# Patient Record
Sex: MALE | Race: WHITE | URBAN - METROPOLITAN AREA
[De-identification: names, ages, dates, MRNs, and addresses within clinical notes are randomized per-mention and may not be internally consistent; named-entity substitution may affect disease eponyms.]

---

## 2017-03-11 DIAGNOSIS — J45.30 MILD PERSISTENT ASTHMA WITHOUT COMPLICATION: ICD-10-CM

## 2017-03-13 NOTE — TELEPHONE ENCOUNTER
Advair Diskus 250-50  Mcg        Last Written Prescription Date: 02/10/2016  Last Fill Quantity: 3 inhaler, # refills: 4    Last Office Visit with G, P or [facility] prescribing provider:  02/10/2016   Future Office Visit:       Date of Last Asthma Action Plan Letter:   Asthma Action Plan Q1 Year    Topic Date Due     Asthma Action Plan - yearly  02/10/2017      Asthma Control Test:   ACT Total Scores 2/10/2016   ACT TOTAL SCORE -   ASTHMA ER VISITS -   ASTHMA HOSPITALIZATIONS -   ACT TOTAL SCORE (Goal Greater than or Equal to 20) 23   In the past 12 months, how many times did you visit the emergency room for your asthma without being admitted to the hospital? 0   In the past 12 months, how many times were you hospitalized overnight because of your asthma? 0       Date of Last Spirometry Test:   No results found for this or any previous visit.          Shirley Horan MA

## 2017-03-15 NOTE — TELEPHONE ENCOUNTER
Medication is being filled for 1 time refill only due to:  Patient needs to be seen because it has been more than one year since last visit. due for ACT  Maryanne Worthington RN

## 2017-04-10 ENCOUNTER — TELEPHONE (OUTPATIENT)
Dept: FAMILY MEDICINE | Facility: CLINIC | Age: 57
End: 2017-04-10

## 2017-04-10 NOTE — TELEPHONE ENCOUNTER
Reason for Call:  Other call back    Detailed comments: Patient calling. He has a cyst and would like to know if you can remove it? Please call and advise.     Phone Number Patient can be reached at: Other phone number:  Cell number above.     Best Time: any    Can we leave a detailed message on this number? YES    Call taken on 4/10/2017 at 12:32 PM by Natacha Ellis

## 2017-04-10 NOTE — TELEPHONE ENCOUNTER
Call him. I would have to see it first to see if I can remove it. If I can't, I sure will know who I can refer him to based on my exam of the cyst.     MANAV KENNEDY M.D.

## 2017-04-10 NOTE — TELEPHONE ENCOUNTER
Detailed message left for patient with providers message as written.  Advised patient to call Scheduling to set up visit or call RN hotline if questions.   Connie Barajas RN

## 2017-04-10 NOTE — TELEPHONE ENCOUNTER
Spoke with patient he has had the cyst on his neck for a while now and is wondering if provider can remove this?  It is about 2 1/2 cm in size, denies pain.  Cyst is a little soft mostly hard under the skin.  Patient has tried hot compresses and this does not work.  Patient states when he wears shirts in the summer people comment about the bump.   Please advise   Connie Barajas RN

## 2017-04-19 ENCOUNTER — OFFICE VISIT (OUTPATIENT)
Dept: FAMILY MEDICINE | Facility: CLINIC | Age: 57
End: 2017-04-19
Payer: COMMERCIAL

## 2017-04-19 VITALS
TEMPERATURE: 97.3 F | DIASTOLIC BLOOD PRESSURE: 94 MMHG | SYSTOLIC BLOOD PRESSURE: 148 MMHG | HEART RATE: 90 BPM | OXYGEN SATURATION: 98 % | HEIGHT: 68 IN | BODY MASS INDEX: 31.25 KG/M2 | WEIGHT: 206.2 LBS

## 2017-04-19 DIAGNOSIS — J45.30 MILD PERSISTENT ASTHMA WITHOUT COMPLICATION: ICD-10-CM

## 2017-04-19 DIAGNOSIS — L72.3 SEBACEOUS CYST: Primary | ICD-10-CM

## 2017-04-19 DIAGNOSIS — J30.1 SEASONAL ALLERGIC RHINITIS DUE TO POLLEN: ICD-10-CM

## 2017-04-19 PROCEDURE — 99213 OFFICE O/P EST LOW 20 MIN: CPT | Performed by: FAMILY MEDICINE

## 2017-04-19 RX ORDER — MOMETASONE FUROATE MONOHYDRATE 50 UG/1
2 SPRAY, METERED NASAL DAILY
Qty: 1 BOX | Refills: 11 | Status: SHIPPED | OUTPATIENT
Start: 2017-04-19 | End: 2018-04-05

## 2017-04-19 RX ORDER — ALBUTEROL SULFATE 90 UG/1
1-2 AEROSOL, METERED RESPIRATORY (INHALATION) EVERY 4 HOURS PRN
Qty: 1 INHALER | Refills: 3 | Status: SHIPPED | OUTPATIENT
Start: 2017-04-19 | End: 2018-05-18

## 2017-04-19 NOTE — PROGRESS NOTES
SUBJECTIVE:                                                    Home Sandoval is a 56 year old male who presents to clinic today for the following health issues:      Patient presents with:  Cyst: neck-back area x 2 yrs   Health Maintenance: ACT  Medication Question: triamcinolone (NASACORT) 55 MCG/ACT nasal inhaler             Problem list and histories reviewed & adjusted, as indicated.  Additional history: as documented    Patient Active Problem List   Diagnosis     LVH (left ventricular hypertrophy)     Mild persistent asthma     Pulmonary stenosis     Erectile dysfunction     Elevated fasting glucose     Hyperlipidemia LDL goal <160     Dermatitis     Seasonal allergic rhinitis     Pseudophakia, od     Past Surgical History:   Procedure Laterality Date     APPENDECTOMY  1987     HEART CATH CHILD  1964     PHACOEMULSIFICATION WITH STANDARD INTRAOCULAR LENS IMPLANT  1/2014    right eye     SINUS SURGERY  5/2002       Social History   Substance Use Topics     Smoking status: Former Smoker     Types: Cigars     Quit date: 11/25/2000     Smokeless tobacco: Never Used     Alcohol use Yes      Comment: Seldom alcohol use     Family History   Problem Relation Age of Onset     C.A.D. Father      CABG     Neurologic Disorder Father      ischemic brain disease     CEREBROVASCULAR DISEASE Father      Eye Disorder Sister      Asthma Mother      C.A.D. Mother      CABG     HEART DISEASE Maternal Grandmother      HEART DISEASE Maternal Grandfather      DIABETES Paternal Grandmother      DIABETES Paternal Grandfather      CANCER Paternal Uncle      esophageal         Current Outpatient Prescriptions   Medication Sig Dispense Refill     fluticasone-salmeterol (ADVAIR DISKUS) 250-50 MCG/DOSE diskus inhaler INHALE 1 PUFF INTO THE LUNGS TWICE DAILY 3 Inhaler 3     albuterol (PROAIR HFA/PROVENTIL HFA/VENTOLIN HFA) 108 (90 BASE) MCG/ACT Inhaler Inhale 1-2 puffs into the lungs every 4 hours as needed for shortness of breath /  dyspnea 1 Inhaler 3     mometasone (NASONEX) 50 MCG/ACT spray Spray 2 sprays into both nostrils daily 1 Box 11     triamcinolone (NASACORT) 55 MCG/ACT nasal inhaler Spray 2 sprays into both nostrils daily       clobetasol (TEMOVATE) 0.05 % ointment Apply a small amount to affected twice daily as needed 1 g 5     [DISCONTINUED] fluticasone-salmeterol (ADVAIR DISKUS) 250-50 MCG/DOSE diskus inhaler INHALE 1 PUFF INTO THE LUNGS TWICE DAILY 1 Inhaler 0     [DISCONTINUED] albuterol (PROAIR HFA, PROVENTIL HFA, VENTOLIN HFA) 108 (90 BASE) MCG/ACT inhaler Inhale 1-2 puffs into the lungs every 4 hours as needed for shortness of breath / dyspnea 1 Inhaler 3     Allergies   Allergen Reactions     [place] [Nuts] GI Disturbance     Atorvastatin Cramps     Eggs Swelling and Difficulty breathing     Fish Swelling     Flu Virus Vaccine      D/t egg allergy      Pravastatin Other (See Comments)     Diarrhea      BP Readings from Last 3 Encounters:   04/19/17 (!) 148/94   02/10/16 158/80   01/07/14 122/88    Wt Readings from Last 3 Encounters:   04/19/17 206 lb 3.2 oz (93.5 kg)   02/10/16 204 lb 12.8 oz (92.9 kg)   01/07/14 180 lb (81.6 kg)                  Labs reviewed in EPIC    Reviewed and updated as needed this visit by clinical staff  Tobacco  Allergies  Meds  Med Hx  Surg Hx  Fam Hx  Soc Hx      Reviewed and updated as needed this visit by Provider         ROS:  This 56 year old male is here today to get his asthma meds refilled. He would like to change from nasacort to nasanex for his nasal polyps. He read that nasonex is the best. He had a colonoscopy at [place], [place] 2 years ago. He is traveling to [place] in August and will get tetanus booster before he goes. He has developed a tender cyst on the back of his neck. His arzate has to be very careful when he shaves the hair off his neck so he doesn't knick it.  He has a primary clinic close to his work for his health maintenance. His cholesterol was normal at his last visit.  "He plans to return there to get his blood pressure rechecked.  He would like to have that cyst removed. All other review of systems are negative  Personal, family, and social history reviewed with patient and revised.         OBJECTIVE:                                                    BP (!) 148/94  Pulse 90  Temp 97.3  F (36.3  C) (Oral)  Ht 5' 8.39\" (1.737 m)  Wt 206 lb 3.2 oz (93.5 kg)  SpO2 98%  BMI 31 kg/m2  Body mass index is 31 kg/(m^2).  GENERAL: healthy, alert and no distress  NECK: no adenopathy, no asymmetry, masses, or scars and thyroid normal to palpation  RESP: lungs clear to auscultation - no rales, rhonchi or wheezes  CV: regular rate and rhythm, normal S1 S2, no S3 or S4, no murmur, click or rub, no peripheral edema   MS: no gross musculoskeletal defects noted, no edema  Patient has large sebaceous cyst on the back of his neck into his left upper back area. It is not red or infected. This should be removed before it gets knicked by the arzate and gets infected.   Diagnostic Test Results:  none      ASSESSMENT/PLAN:                                                             1. Sebaceous cyst  As above   - GENERAL SURG ADULT REFERRAL    2. Mild persistent asthma without complication  ACT = 24. Good control   - fluticasone-salmeterol (ADVAIR DISKUS) 250-50 MCG/DOSE diskus inhaler; INHALE 1 PUFF INTO THE LUNGS TWICE DAILY  Dispense: 3 Inhaler; Refill: 3  - albuterol (PROAIR HFA/PROVENTIL HFA/VENTOLIN HFA) 108 (90 BASE) MCG/ACT Inhaler; Inhale 1-2 puffs into the lungs every 4 hours as needed for shortness of breath / dyspnea  Dispense: 1 Inhaler; Refill: 3    3. Seasonal allergic rhinitis due to pollen  As above   - mometasone (NASONEX) 50 MCG/ACT spray; Spray 2 sprays into both nostrils daily  Dispense: 1 Box; Refill: 11    Return to clinic as needed     MANAV KENNEDY MD  [facility]  "

## 2017-04-19 NOTE — NURSING NOTE
"Chief Complaint   Patient presents with     Cyst     neck-back area x 2 yrs      Health Maintenance     ACT     Medication Question     triamcinolone (NASACORT) 55 MCG/ACT nasal inhaler       Initial BP (!) 148/94  Pulse 90  Temp 97.3  F (36.3  C) (Oral)  Ht 5' 8.39\" (1.737 m)  Wt 206 lb 3.2 oz (93.5 kg)  SpO2 98%  BMI 31 kg/m2 Estimated body mass index is 31 kg/(m^2) as calculated from the following:    Height as of this encounter: 5' 8.39\" (1.737 m).    Weight as of this encounter: 206 lb 3.2 oz (93.5 kg).  Medication Reconciliation: complete     An SAPPHIRE Horan    "

## 2017-04-19 NOTE — Clinical Note
Please abstract the following data from this visit with this patient into the appropriate field in Epic:  Colonoscopy done on this date: 01/2014 or 2015 (approximately), by this group: [facility] , results were Normal.  Shirley Horan MA

## 2017-04-19 NOTE — PATIENT INSTRUCTIONS
[facility]    If you have any questions regarding to your visit please contact your care team:       Team Purple:   Clinic Hours Telephone Number   [address] Dr[address]   7am-7pm  Monday - Thursday   7am-5pm  Fridays  [phone]  (Appointment scheduling available 24/7)    Questions about your Visit?   Team Line:  [phone]   Urgent Care - [place] and [facility] - 11am-9pm Monday-Friday Saturday-Sunday- 9am-5pm   [place] - 5pm-9pm Monday-Friday Saturday-Sunday- 9am-5pm  [phone] - [facility]  [phone] - [place]       What options do I have for visits at the clinic other than the traditional office visit?  To expand how we care for you, many of our providers are utilizing electronic visits (e-visits) and telephone visits, when medically appropriate, for interactions with their patients rather than a visit in the clinic.   We also offer nurse visits for many medical concerns. Just like any other service, we will bill your insurance company for this type of visit based on time spent on the phone with your provider. Not all insurance companies cover these visits. Please check with your medical insurance if this type of visit is covered. You will be responsible for any charges that are not paid by your insurance.      E-visits via [employer]:  generally incur a $35.00 fee.  Telephone visits:  Time spent on the phone: *charged based on time that is spent on the phone in increments of 10 minutes. Estimated cost:   5-10 mins $30.00   11-20 mins. $59.00   21-30 mins. $85.00     Use [employer] (secure email communication and access to your chart) to send your primary care provider a message or make an appointment. Ask someone on your Team how to sign up for [employer].  For a Price Quote for your services, please call our Consumer Price Line at [phone].  As always, Thank you for trusting us with your health care needs!

## 2017-04-19 NOTE — LETTER
My Asthma Action Plan  Name: Home Sandoval   YOB: 1960  Date: 4/19/2017   My doctor: MNAAV KENNEDY MD   My clinic: [facility]        My Control Medicine: Fluticasone + salmeterol (Advair) -  Diskus 250/50 mcg 1 puff twice a day  My Rescue Medicine: Albuterol (Proair/Ventolin/Proventil) inhaler 2 puffs every 4 hours as needed   My Asthma Severity: mild persistent  Avoid your asthma triggers: smoke, upper respiratory infections, dust mites and pollens               GREEN ZONE     Good Control    I feel good    No cough or wheeze    Can work, sleep and play without asthma symptoms       Take your asthma control medicine every day.     1. If exercise triggers your asthma, take your rescue medication    15 minutes before exercise or sports, and    During exercise if you have asthma symptoms  2. Spacer to use with inhaler: If you have a spacer, make sure to use it with your inhaler             YELLOW ZONE     Getting Worse  I have ANY of these:    I do not feel good    Cough or wheeze    Chest feels tight    Wake up at night   1. Keep taking your Green Zone medications  2. Start taking your rescue medicine:    every 20 minutes for up to 1 hour. Then every 4 hours for 24-48 hours.  3. If you stay in the Yellow Zone for more than 12-24 hours, contact your doctor.  4. If you do not return to the Green Zone in 12-24 hours or you get worse, start taking your oral steroid medicine if prescribed by your provider.           RED ZONE     Medical Alert - Get Help  I have ANY of these:    I feel awful    Medicine is not helping    Breathing getting harder    Trouble walking or talking    Nose opens wide to breathe       1. Take your rescue medicine NOW  2. If your provider has prescribed an oral steroid medicine, start taking it NOW  3. Call your doctor NOW  4. If you are still in the Red Zone after 20 minutes and you have not reached your doctor:    Take your rescue medicine again and    Call  911 or go to the emergency room right away    See your regular doctor within 2 weeks of an Emergency Room or Urgent Care visit for follow-up treatment.        Electronically signed by: MANAV KENNEDY, April 19, 2017    Annual Reminders:  Meet with Asthma Educator,  Flu Shot in the Fall, consider Pneumonia Vaccination for patients with asthma (aged 19 and older).    Pharmacy:    [id number] - [place] - [address] AT [id number] - [place] - [address] [facility] [facility]                    Asthma Triggers  How To Control Things That Make Your Asthma Worse    Triggers are things that make your asthma worse.  Look at the list below to help you find your triggers and what you can do about them.  You can help prevent asthma flare-ups by staying away from your triggers.      Trigger                                                          What you can do   Cigarette Smoke  Tobacco smoke can make asthma worse. Do not allow smoking in your home, car or around you.  Be sure no one smokes at a child s day care or school.  If you smoke, ask your health care provider for ways to help you quit.  Ask family members to quit too.  Ask your health care provider for a referral to Quit Plan to help you quit smoking, or call 6-962-963-PLAN.     Colds, Flu, Bronchitis  These are common triggers of asthma. Wash your hands often.  Don t touch your eyes, nose or mouth.  Get a flu shot every year.     Dust Mites  These are tiny bugs that live in cloth or carpet. They are too small to see. Wash sheets and blankets in hot water every week.   Encase pillows and mattress in dust mite proof covers.  Avoid having carpet if you can. If you have carpet, vacuum weekly.   Use a dust mask and HEPA vacuum.   Pollen and Outdoor Mold  Some people are allergic to trees, grass, or weed pollen, or molds. Try to keep your windows closed.  Limit time out doors when pollen count is high.   Ask  you health care provider about taking medicine during allergy season.     Animal Dander  Some people are allergic to skin flakes, urine or saliva from pets with fur or feathers. Keep pets with fur or feathers out of your home.    If you can t keep the pet outdoors, then keep the pet out of your bedroom.  Keep the bedroom door closed.  Keep pets off cloth furniture and away from stuffed toys.     Mice, Rats, and Cockroaches  Some people are allergic to the waste from these pests.   Cover food and garbage.  Clean up spills and food crumbs.  Store grease in the refrigerator.   Keep food out of the bedroom.   Indoor Mold  This can be a trigger if your home has high moisture. Fix leaking faucets, pipes, or other sources of water.   Clean moldy surfaces.  Dehumidify basement if it is damp and smelly.   Smoke, Strong Odors, and Sprays  These can reduce air quality. Stay away from strong odors and sprays, such as perfume, powder, hair spray, paints, smoke incense, paint, cleaning products, candles and new carpet.   Exercise or Sports  Some people with asthma have this trigger. Be active!  Ask your doctor about taking medicine before sports or exercise to prevent symptoms.    Warm up for 5-10 minutes before and after sports or exercise.     Other Triggers of Asthma  Cold air:  Cover your nose and mouth with a scarf.  Sometimes laughing or crying can be a trigger.  Some medicines and food can trigger asthma.

## 2017-04-19 NOTE — TELEPHONE ENCOUNTER
Reason for Call:  Other prescription    Detailed comments: Patient states his prescription for ADVAIR DISKUS was refilled for 3-months instead of the norm.  Patient is requesting a refill for the year. Please call patient if necessary.    Phone Number Patient can be reached at: Home number on file [phone] (home)    Best Time: anaytime    Can we leave a detailed message on this number? YES    Call taken on 4/19/2017 at 4:31 PM by Nandini Olmos

## 2017-04-19 NOTE — MR AVS SNAPSHOT
After Visit Summary   4/19/2017    Home Sandoval    MRN: [id number]           Patient Information     Date Of Birth          1960        Visit Information        Provider Department      4/19/2017 8:30 AM Randi La MD [facility]        Today's Diagnoses     Sebaceous cyst    -  1    Mild persistent asthma without complication        Seasonal allergic rhinitis due to pollen          Care Instructions    [facility]    If you have any questions regarding to your visit please contact your care team:       Team Purple:   Clinic Hours Telephone Number   [address] Dr.   7am-7pm  Monday - Thursday   7am-5pm  Fridays  [phone]  (Appointment scheduling available 24/7)    Questions about your Visit?   Team Line:  [phone]   Urgent Care - [place] and [facility] - 11am-9pm Monday-Friday Saturday-Sunday- 9am-5pm   [place] - 5pm-9pm Monday-Friday Saturday-Sunday- 9am-5pm  [phone] - [facility]  [phone] - [place]       What options do I have for visits at the clinic other than the traditional office visit?  To expand how we care for you, many of our providers are utilizing electronic visits (e-visits) and telephone visits, when medically appropriate, for interactions with their patients rather than a visit in the clinic.   We also offer nurse visits for many medical concerns. Just like any other service, we will bill your insurance company for this type of visit based on time spent on the phone with your provider. Not all insurance companies cover these visits. Please check with your medical insurance if this type of visit is covered. You will be responsible for any charges that are not paid by your insurance.      E-visits via [employer]:  generally incur a $35.00 fee.  Telephone visits:  Time spent on the phone: *charged based on time that is spent on the phone in increments  of 10 minutes. Estimated cost:   5-10 mins $30.00   11-20 mins. $59.00   21-30 mins. $85.00     Use MTailorhart (secure email communication and access to your chart) to send your primary care provider a message or make an appointment. Ask someone on your Team how to sign up for [employer].  For a Price Quote for your services, please call our [employer] Line at [phone].  As always, Thank you for trusting us with your health care needs!            Follow-ups after your visit        Additional Services     GENERAL SURG ADULT REFERRAL       Your provider has referred you to: [facility]: [facility] [place] [phone]   [email or web address]    Please be aware that coverage of these services is subject to the terms and limitations of your health insurance plan.  Call member services at your health plan with any benefit or coverage questions.      Please bring the following with you to your appointment:    (1) Any X-Rays, CTs or MRIs which have been performed.  Contact the facility where they were done to arrange for  prior to your scheduled appointment.   (2) List of current medications   (3) This referral request   (4) Any documents/labs given to you for this referral                  Who to contact     If you have questions or need follow up information about today's clinic visit or your schedule please contact [facility] directly at [phone].  Normal or non-critical lab and imaging results will be communicated to you by MyChart, letter or phone within 4 business days after the clinic has received the results. If you do not hear from us within 7 days, please contact the clinic through MTailorhart or phone. If you have a critical or abnormal lab result, we will notify you by phone as soon as possible.  Submit refill requests through [employer] or call your pharmacy and they will forward the refill request to us. Please allow 3 business days for your refill to be  "completed.          Additional Information About Your Visit        Metavanahart Information     SleepOut gives you secure access to your electronic health record. If you see a primary care provider, you can also send messages to your care team and make appointments. If you have questions, please call your primary care clinic.  If you do not have a primary care provider, please call [phone] and they will assist you.        Care EveryWhere ID     This is your Care EveryWhere ID. This could be used by other organizations to access your [place] medical records  [id number]        Your Vitals Were     Pulse Temperature Height Pulse Oximetry BMI (Body Mass Index)       90 97.3  F (36.3  C) (Oral) 5' 8.39\" (1.737 m) 98% 31 kg/m2        Blood Pressure from Last 3 Encounters:   04/19/17 (!) 148/94   02/10/16 158/80   01/07/14 122/88    Weight from Last 3 Encounters:   04/19/17 206 lb 3.2 oz (93.5 kg)   02/10/16 204 lb 12.8 oz (92.9 kg)   01/07/14 180 lb (81.6 kg)              We Performed the Following     Asthma Action Plan (AAP)     GENERAL SURG ADULT REFERRAL          Today's Medication Changes          These changes are accurate as of: 4/19/17  8:59 AM.  If you have any questions, ask your nurse or doctor.               Start taking these medicines.        Dose/Directions    mometasone 50 MCG/ACT spray   Commonly known as:  NASONEX   Used for:  Seasonal allergic rhinitis due to pollen   Started by:  Randi La MD        Dose:  2 spray   Spray 2 sprays into both nostrils daily   Quantity:  1 Box   Refills:  11            Where to get your medicines      These medications were sent to [facility]/pharmacy #[id number] - [place], [place] - [address] [address]     Phone:  [phone]     albuterol 108 (90 BASE) MCG/ACT Inhaler    fluticasone-salmeterol 250-50 MCG/DOSE diskus inhaler    mometasone 50 MCG/ACT spray                Primary Care Provider Office Phone # Fax #    Randi GARCIA " MD Bessie [phone]       [address]  [place] [address]        Thank you!     Thank you for choosing [facility]  for your care. Our goal is always to provide you with excellent care. Hearing back from our patients is one way we can continue to improve our services. Please take a few minutes to complete the written survey that you may receive in the mail after your visit with us. Thank you!             Your Updated Medication List - Protect others around you: Learn how to safely use, store and throw away your medicines at www.disposemymeds.org.          This list is accurate as of: 4/19/17  8:59 AM.  Always use your most recent med list.                   Brand Name Dispense Instructions for use    albuterol 108 (90 BASE) MCG/ACT Inhaler    PROAIR HFA/PROVENTIL HFA/VENTOLIN HFA    1 Inhaler    Inhale 1-2 puffs into the lungs every 4 hours as needed for shortness of breath / dyspnea       clobetasol 0.05 % ointment    TEMOVATE    1 g    Apply a small amount to affected twice daily as needed       fluticasone-salmeterol 250-50 MCG/DOSE diskus inhaler    ADVAIR DISKUS    3 Inhaler    INHALE 1 PUFF INTO THE LUNGS TWICE DAILY       mometasone 50 MCG/ACT spray    NASONEX    1 Box    Spray 2 sprays into both nostrils daily       triamcinolone 55 MCG/ACT Inhaler    NASACORT     Spray 2 sprays into both nostrils daily

## 2017-04-20 ASSESSMENT — ASTHMA QUESTIONNAIRES: ACT_TOTALSCORE: 24

## 2017-04-20 NOTE — TELEPHONE ENCOUNTER
Patient is call today because his insurance will not cover 3 inhalers per month is requesting to have prescription change to Disp 1 with 6 refills. Lola Castro MA

## 2017-04-26 ENCOUNTER — OFFICE VISIT (OUTPATIENT)
Dept: SURGERY | Facility: CLINIC | Age: 57
End: 2017-04-26
Payer: COMMERCIAL

## 2017-04-26 VITALS
DIASTOLIC BLOOD PRESSURE: 102 MMHG | HEIGHT: 68 IN | HEART RATE: 91 BPM | WEIGHT: 206 LBS | SYSTOLIC BLOOD PRESSURE: 140 MMHG | BODY MASS INDEX: 31.22 KG/M2

## 2017-04-26 DIAGNOSIS — L72.3 SEBACEOUS CYST: Primary | ICD-10-CM

## 2017-04-26 PROCEDURE — 99243 OFF/OP CNSLTJ NEW/EST LOW 30: CPT | Performed by: SURGERY

## 2017-04-26 NOTE — PROGRESS NOTES
Patient seen in consultation for cyst on neck by Randi La    HPI:  Patient is a 56 year old male  with complaints of cyst on the back of his neck  The patient noticed the symptoms about 2-3 years ago.    This has been getting larger but current size has been steady for maybe the past year  Is sore most of the time but gets more painful when it is hit by barbers tools  Has not opened up or drained that he recalls- might have back years ago when first came up  nothing makes the episode better.  Patient has family history of similar problems- brother had similar cyst    Review Of Systems    Skin: as above  Ears/Nose/Throat: negative  Respiratory: No shortness of breath, dyspnea on exertion, cough, or hemoptysis. Former smoker  Cardiovascular: MVP  Gastrointestinal: negative  Genitourinary: negative  Musculoskeletal: negative  Neurologic: negative  Hematologic/Lymphatic/Immunologic: negative  Endocrine: negative      Past Medical History:   Diagnosis Date     Cataract, od      Dermatitis      Elevated fasting glucose      Erectile dysfunction      Fish allergy      Hyperlipidemia      LVH (left ventricular hypertrophy)      Mild persistent asthma      Nut allergy      Pulmonary stenosis      Seasonal allergic rhinitis        Past Surgical History:   Procedure Laterality Date     APPENDECTOMY  1987     HEART CATH CHILD  1964     PHACOEMULSIFICATION WITH STANDARD INTRAOCULAR LENS IMPLANT  1/2014    right eye     SINUS SURGERY  5/2002       Social History     Social History     Marital status:      Spouse name: Rebeca      Number of children: 1     Years of education: 16     Occupational History       [employer]     Social History Main Topics     Smoking status: Former Smoker     Types: Cigars     Quit date: 11/25/2000     Smokeless tobacco: Never Used     Alcohol use Yes      Comment: Seldom alcohol use     Drug use: No     Sexual activity: Yes     Partners: Female  "    Other Topics Concern     Parent/Sibling W/ Cabg, Mi Or Angioplasty Before 65f 55m? No     Social History Narrative       Current Outpatient Prescriptions   Medication Sig Dispense Refill     fluticasone-salmeterol (ADVAIR DISKUS) 250-50 MCG/DOSE diskus inhaler INHALE 1 PUFF INTO THE LUNGS TWICE DAILY 1 Inhaler 11     albuterol (PROAIR HFA/PROVENTIL HFA/VENTOLIN HFA) 108 (90 BASE) MCG/ACT Inhaler Inhale 1-2 puffs into the lungs every 4 hours as needed for shortness of breath / dyspnea 1 Inhaler 3     mometasone (NASONEX) 50 MCG/ACT spray Spray 2 sprays into both nostrils daily 1 Box 11     triamcinolone (NASACORT) 55 MCG/ACT nasal inhaler Spray 2 sprays into both nostrils daily       clobetasol (TEMOVATE) 0.05 % ointment Apply a small amount to affected twice daily as needed 1 g 5       Medications and history reviewed    Physical exam:  Vitals: BP (!) 140/102  Pulse 91  Ht 1.727 m (5' 8\")  Wt 93.4 kg (206 lb)  BMI 31.32 kg/m2  BMI= Body mass index is 31.32 kg/(m^2).    Constitutional: healthy, alert and no distress  Head: Normocephalic. No masses, lesions, tenderness or abnormalities  Cardiovascular: negative, PMI normal. No lifts, heaves, or thrills. RRR. No murmurs, clicks gallops or rub  Respiratory: negative, Percussion normal. Good diaphragmatic excursion. Lungs clear  Gastrointestinal: Abdomen soft, non-tender. BS normal. No masses, organomegaly  : Deferred  Musculoskeletal: extremities normal- no gross deformities noted, gait normal and normal muscle tone  Skin: low left posterior neck with ~2cm cystic lesion, full/firm no signs of infection mildly tender  Psychiatric: mentation appears normal and affect normal/bright  Hematologic/Lymphatic/Immunologic: Normal cervical lymph nodes  Patient able to get up on table without difficulty.      Assessment:     ICD-10-CM    1. Sebaceous cyst L72.3      Plan: Offered excision as this is becoming increasingly bothersome and patient desires to have it removed. " Will plan to do in clinic under local. Patient will schedule.    Noe Goodrich MD

## 2017-04-26 NOTE — MR AVS SNAPSHOT
After Visit Summary   4/26/2017    Home Sandoval    MRN: [id number]           Patient Information     Date Of Birth          1960        Visit Information        Provider Department      4/26/2017 8:15 AM Noe Goodrich MD [facility]        Today's Diagnoses     Sebaceous cyst    -  1       Follow-ups after your visit        Your next 10 appointments already scheduled     May 03, 2017  7:30 AM CDT   PROCEDURE with Noe Goodrich MD   [facility] ([facility])    [address] [place] [id number] [phone]              Who to contact     If you have questions or need follow up information about today's clinic visit or your schedule please contact [facility] directly at [phone].  Normal or non-critical lab and imaging results will be communicated to you by MyChart, letter or phone within 4 business days after the clinic has received the results. If you do not hear from us within 7 days, please contact the clinic through MyChart or phone. If you have a critical or abnormal lab result, we will notify you by phone as soon as possible.  Submit refill requests through [employer] or call your pharmacy and they will forward the refill request to us. Please allow 3 business days for your refill to be completed.          Additional Information About Your Visit        MyChart Information     [employer] gives you secure access to your electronic health record. If you see a primary care provider, you can also send messages to your care team and make appointments. If you have questions, please call your primary care clinic.  If you do not have a primary care provider, please call [phone] and they will assist you.        Care EveryWhere ID     This is your Care EveryWhere ID. This could be used by other organizations to access your [place] medical records  [id number]        Your Vitals Were     Pulse Height BMI (Body  "Mass Index)             91 1.727 m (5' 8\") 31.32 kg/m2          Blood Pressure from Last 3 Encounters:   04/26/17 (!) 140/102   04/19/17 (!) 148/94   02/10/16 158/80    Weight from Last 3 Encounters:   04/26/17 93.4 kg (206 lb)   04/19/17 93.5 kg (206 lb 3.2 oz)   02/10/16 92.9 kg (204 lb 12.8 oz)              Today, you had the following     No orders found for display       Primary Care Provider Office Phone # Fax #    Randi La [phone]       [address] [address]        Thank you!     Thank you for choosing [facility]  for your care. Our goal is always to provide you with excellent care. Hearing back from our patients is one way we can continue to improve our services. Please take a few minutes to complete the written survey that you may receive in the mail after your visit with us. Thank you!             Your Updated Medication List - Protect others around you: Learn how to safely use, store and throw away your medicines at www.disposemymeds.org.          This list is accurate as of: 4/26/17  8:34 AM.  Always use your most recent med list.                   Brand Name Dispense Instructions for use    albuterol 108 (90 BASE) MCG/ACT Inhaler    PROAIR HFA/PROVENTIL HFA/VENTOLIN HFA    1 Inhaler    Inhale 1-2 puffs into the lungs every 4 hours as needed for shortness of breath / dyspnea       clobetasol 0.05 % ointment    TEMOVATE    1 g    Apply a small amount to affected twice daily as needed       fluticasone-salmeterol 250-50 MCG/DOSE diskus inhaler    ADVAIR DISKUS    1 Inhaler    INHALE 1 PUFF INTO THE LUNGS TWICE DAILY       mometasone 50 MCG/ACT spray    NASONEX    1 Box    Spray 2 sprays into both nostrils daily       triamcinolone 55 MCG/ACT Inhaler    NASACORT     Spray 2 sprays into both nostrils daily         "

## 2017-04-26 NOTE — NURSING NOTE
"Chief Complaint   Patient presents with     Derm Problem       Initial BP (!) 140/102  Pulse 91  Ht 5' 8\" (1.727 m)  Wt 206 lb (93.4 kg)  BMI 31.32 kg/m2 Estimated body mass index is 31.32 kg/(m^2) as calculated from the following:    Height as of this encounter: 5' 8\" (1.727 m).    Weight as of this encounter: 206 lb (93.4 kg).  Medication Reconciliation: complete   Arielle Short Cma      "

## 2017-05-03 ENCOUNTER — TELEPHONE (OUTPATIENT)
Dept: SURGERY | Facility: CLINIC | Age: 57
End: 2017-05-03

## 2017-05-03 ENCOUNTER — OFFICE VISIT (OUTPATIENT)
Dept: SURGERY | Facility: CLINIC | Age: 57
End: 2017-05-03
Payer: COMMERCIAL

## 2017-05-03 VITALS
WEIGHT: 206 LBS | BODY MASS INDEX: 31.22 KG/M2 | HEIGHT: 68 IN | DIASTOLIC BLOOD PRESSURE: 94 MMHG | SYSTOLIC BLOOD PRESSURE: 129 MMHG | HEART RATE: 73 BPM

## 2017-05-03 DIAGNOSIS — L72.3 SEBACEOUS CYST: Primary | ICD-10-CM

## 2017-05-03 PROCEDURE — 11423 EXC H-F-NK-SP B9+MARG 2.1-3: CPT | Performed by: SURGERY

## 2017-05-03 PROCEDURE — 12042 INTMD RPR N-HF/GENIT2.6-7.5: CPT | Performed by: SURGERY

## 2017-05-03 PROCEDURE — 88304 TISSUE EXAM BY PATHOLOGIST: CPT | Performed by: SURGERY

## 2017-05-03 NOTE — PROGRESS NOTES
PROCEDURE:   Written consent was obtained  The posterior neck area was prepped and appropriately anesthetized with 1% lidocaine with epinephrine. Using the usual technique, full thickness elliptical excision in total was performed. The cyst/lesion measured about 3 x 2 x 2 cm on removal. This was able to removed whole and intact.  Closure was accomplished in two layers with 4 3-0 Vicryl interrupted sutures. for the deeper layer and running subcuticular 4-0 vicryl for the skin. Final wound length was 4cm. Skin was covered with dermabond for dressing.  The procedure was well tolerated and without complications. Specimen was sent to Pathology.    Noe Goodrich MD

## 2017-05-03 NOTE — NURSING NOTE
"Chief Complaint   Patient presents with     Procedure       Initial BP (!) 129/94  Pulse 73  Ht 5' 8\" (1.727 m)  Wt 206 lb (93.4 kg)  BMI 31.32 kg/m2 Estimated body mass index is 31.32 kg/(m^2) as calculated from the following:    Height as of this encounter: 5' 8\" (1.727 m).    Weight as of this encounter: 206 lb (93.4 kg).  Medication Reconciliation: complete   Arielle Short Cma      "

## 2017-05-03 NOTE — TELEPHONE ENCOUNTER
Reason for call:  Patient reporting a symptom    Symptom or request: Swelling in neck post surgery today.    Duration (how long have symptoms been present): Today    Have you been treated for this before? No    Additional comments: na    Phone Number patient can be reached at:  Home number on file [phone] (home)    Best Time:  ASAP    Can we leave a detailed message on this number:  YES    Call taken on 5/3/2017 at 6:56 PM by Nandini Olmos

## 2017-05-03 NOTE — MR AVS SNAPSHOT
"              After Visit Summary   5/3/2017    Home Sandoval    MRN: [id number]           Patient Information     Date Of Birth          1960        Visit Information        Provider Department      5/3/2017 7:30 AM Noe Goodrich MD [facility]        Today's Diagnoses     Sebaceous cyst    -  1       Follow-ups after your visit        Who to contact     If you have questions or need follow up information about today's clinic visit or your schedule please contact [facility] directly at [phone].  Normal or non-critical lab and imaging results will be communicated to you by [employer]hart, letter or phone within 4 business days after the clinic has received the results. If you do not hear from us within 7 days, please contact the clinic through [employer] or phone. If you have a critical or abnormal lab result, we will notify you by phone as soon as possible.  Submit refill requests through [employer] or call your pharmacy and they will forward the refill request to us. Please allow 3 business days for your refill to be completed.          Additional Information About Your Visit        MyChart Information     [employer] gives you secure access to your electronic health record. If you see a primary care provider, you can also send messages to your care team and make appointments. If you have questions, please call your primary care clinic.  If you do not have a primary care provider, please call [phone] and they will assist you.        Care EveryWhere ID     This is your Care EveryWhere ID. This could be used by other organizations to access your [place] medical records  [id number]        Your Vitals Were     Pulse Height BMI (Body Mass Index)             73 1.727 m (5' 8\") 31.32 kg/m2          Blood Pressure from Last 3 Encounters:   05/03/17 (!) 129/94   04/26/17 (!) 140/102   04/19/17 (!) 148/94    Weight from Last 3 Encounters:   05/03/17 93.4 kg (206 lb)   04/26/17 93.4 kg " (206 lb)   04/19/17 93.5 kg (206 lb 3.2 oz)              We Performed the Following     EXC BENIGN SKIN LESION TRUNK/ARM/LEG 2.1-3.0 CM     REPAIR INTERMED, WOUND TRUNK/ARM/LEG 2.6-7.5 CM     Surgical pathology exam        Primary Care Provider Office Phone # Fax #    Randi La [phone]       [address] [address]        Thank you!     Thank you for choosing [facility]  for your care. Our goal is always to provide you with excellent care. Hearing back from our patients is one way we can continue to improve our services. Please take a few minutes to complete the written survey that you may receive in the mail after your visit with us. Thank you!             Your Updated Medication List - Protect others around you: Learn how to safely use, store and throw away your medicines at www.disposemymeds.org.          This list is accurate as of: 5/3/17  8:15 AM.  Always use your most recent med list.                   Brand Name Dispense Instructions for use    albuterol 108 (90 BASE) MCG/ACT Inhaler    PROAIR HFA/PROVENTIL HFA/VENTOLIN HFA    1 Inhaler    Inhale 1-2 puffs into the lungs every 4 hours as needed for shortness of breath / dyspnea       clobetasol 0.05 % ointment    TEMOVATE    1 g    Apply a small amount to affected twice daily as needed       fluticasone-salmeterol 250-50 MCG/DOSE diskus inhaler    ADVAIR DISKUS    1 Inhaler    INHALE 1 PUFF INTO THE LUNGS TWICE DAILY       mometasone 50 MCG/ACT spray    NASONEX    1 Box    Spray 2 sprays into both nostrils daily       triamcinolone 55 MCG/ACT Inhaler    NASACORT     Spray 2 sprays into both nostrils daily

## 2017-05-03 NOTE — LETTER
[id number] [facility] [place] [address]           Tel [phone]  Home SHERRY Sandoval  [address] [place] [address]      May 5, 2017    Dear Home,  This letter is to inform you of the results of your pathology report on your cyst.    Your pathology report was:  FINAL DIAGNOSIS:   Skin ellipse, from posterior neck, excision:   - Epidermal inclusion cyst.  Nothing unexpected seen there  If you do have further questions please don t hesitate to call my assistant at [phone].  We do not have someone answering the phone all the time at my assistants number so if leave a message may take a day or so to get back to you.  So if more urgent then call the below number.    To make an appointment call .   Sincerely,     Noe Goodrich M.D.

## 2017-05-04 LAB — COPATH REPORT: NORMAL

## 2017-05-04 NOTE — TELEPHONE ENCOUNTER
Home Sandoval is a 57 year old male who calls with concerns of neck swelling after having a cyst removed. Patient states he had cyst removed yesterday and didn't have any swelling until almost 12 hours later which is when he called clinic. He was told that an on-call MD would call him back but he did not get a call. Swelling today has improved, patient states he has been icing and using ibuprofen. Denies fever and redness, no other complaints.      NURSING ASSESSMENT:  Description:  See above  Onset/duration: since last night  Precip. factors:  Cyst removal  Associated symptoms:  swelling  Improves/worsens symptoms: Symptoms improving with ice/Ibuprofen  Last exam/Treatment:  5/3/17  Allergies:   Allergies   Allergen Reactions     [place] [Nuts] GI Disturbance     Atorvastatin Cramps     Eggs Swelling and Difficulty breathing     Fish Swelling     Flu Virus Vaccine      D/t egg allergy      Pravastatin Other (See Comments)     Diarrhea        MEDICATIONS:   Taking medication(s) as prescribed? N/A  Taking over the counter medication(s?) Yes  Any medication side effects? No significant side effects    Any barriers to taking medication(s) as prescribed?  N/A  Medication(s) improving/managing symptoms?  Yes  Medication reconciliation completed: N/A      NURSING PLAN: RN spoke with MA who was working in surgery today with Dr. Damico who confirmed that swelling is normal.    RECOMMENDED DISPOSITION:  Home care advice - Continue with ice/ibuprofen and call us back if symptoms worsen/new symptoms, patient states if it does get worse, he can request an appointment to be seen by a provider also. No further questions or complaints at this time.  Will comply with recommendation: Yes  If further questions/concerns or if symptoms do not improve, worsen or new symptoms develop, call your PCP or [facility] as soon as possible.      Guideline used:  Telephone Triage Protocols for Nurses, Fourth Edition, [place]a  Adi Lechuga RN

## 2018-05-05 DIAGNOSIS — J30.1 SEASONAL ALLERGIC RHINITIS DUE TO POLLEN: ICD-10-CM

## 2018-05-07 DIAGNOSIS — J45.30 MILD PERSISTENT ASTHMA WITHOUT COMPLICATION: ICD-10-CM

## 2018-05-07 RX ORDER — MOMETASONE FUROATE MONOHYDRATE 50 UG/1
SPRAY, METERED NASAL
Qty: 17 G | Refills: 0 | Status: SHIPPED | OUTPATIENT
Start: 2018-05-07 | End: 2018-05-18

## 2018-05-07 NOTE — TELEPHONE ENCOUNTER
Medication is being filled for 1 time refill only due to:  Patient needs to be seen because it has been more than one year since last visit.   Pt notified appt is needed for further refills. Pt will call back.  Mary Moscoso RN

## 2018-05-14 NOTE — PROGRESS NOTES
SUBJECTIVE:   Home Sandoval is a 58 year old male who presents to clinic today for the following health issues:    Asthma Follow-Up   Had some sinus issues but this have resolved.    Was ACT completed today?    Yes    ACT Total Scores 5/18/2018   ACT TOTAL SCORE -   ASTHMA ER VISITS -   ASTHMA HOSPITALIZATIONS -   ACT TOTAL SCORE (Goal Greater than or Equal to 20) 24   In the past 12 months, how many times did you visit the emergency room for your asthma without being admitted to the hospital? 0   In the past 12 months, how many times were you hospitalized overnight because of your asthma? 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      Rate your low fat/cholesterol diet?: good    Taking statin?  Stopped due to stomach upset with prvastatin    Other lipid medications/supplements?:  none    Weight:   Been stable though down from last few checks.  Wt Readings from Last 4 Encounters:   05/18/18 203 lb (92.1 kg)   05/03/17 206 lb (93.4 kg)   04/26/17 206 lb (93.4 kg)   04/19/17 206 lb 3.2 oz (93.5 kg)         Amount of exercise or physical activity: 4-5 days/week for an average of 45-60 minutes    Problems taking medications regularly: No    Medication side effects: none    Diet: regular (no restrictions), just cant eat fish.       Problem list and histories reviewed & adjusted, as indicated.  Additional history: as documented    Patient Active Problem List   Diagnosis     LVH (left ventricular hypertrophy)     Mild persistent asthma     Pulmonary stenosis     Erectile dysfunction     Elevated fasting glucose     Hyperlipidemia LDL goal <160     Dermatitis     Seasonal allergic rhinitis     Pseudophakia, od     Past Surgical History:   Procedure Laterality Date     APPENDECTOMY  1987     HEART CATH CHILD  1964     PHACOEMULSIFICATION WITH STANDARD INTRAOCULAR LENS IMPLANT  1/2014    right eye     SINUS SURGERY   "5/2002       Social History   Substance Use Topics     Smoking status: Former Smoker     Types: Cigars     Quit date: 11/25/2000     Smokeless tobacco: Never Used     Alcohol use Yes      Comment: Seldom alcohol use     Family History   Problem Relation Age of Onset     C.A.D. Father      CABG     Neurologic Disorder Father      ischemic brain disease     CEREBROVASCULAR DISEASE Father      Eye Disorder Sister      Asthma Mother      C.A.D. Mother      CABG     HEART DISEASE Maternal Grandmother      HEART DISEASE Maternal Grandfather      DIABETES Paternal Grandmother      DIABETES Paternal Grandfather      CANCER Paternal Uncle      esophageal         Reviewed and updated as needed this visit by clinical staff  Tobacco  Allergies  Meds  Med Hx  Surg Hx  Fam Hx  Soc Hx      ROS:  Constitutional, cardiovascular, gi and gu systems are negative, except as otherwise noted.    OBJECTIVE:     /88 (BP Location: Left arm, Patient Position: Chair, Cuff Size: Adult Regular)  Pulse 85  Temp 97.4  F (36.3  C) (Oral)  Resp 12  Ht 5' 9\" (1.753 m)  Wt 203 lb (92.1 kg)  SpO2 98%  BMI 29.98 kg/m2  Body mass index is 29.98 kg/(m^2).  GENERAL: healthy, alert and no distress  NECK: no adenopathy and thyroid normal to palpation  RESP: lungs clear to auscultation - no rales, rhonchi or wheezes  CV: regular rate and rhythm, normal S1 S2, no S3 or S4, no murmur, click or rub.  ABDOMEN: soft, nontender, no masses and bowel sounds normal  MS: no gross musculoskeletal defects noted, no edema    Diagnostic Test Results:  none     ASSESSMENT/PLAN:     (J45.30) Mild persistent asthma without complication  (primary encounter diagnosis)  Comment: ACT score 24. Discussed role of rescue and controller medications, triggers and refills given.  Plan: albuterol (PROAIR HFA/PROVENTIL HFA/VENTOLIN         HFA) 108 (90 Base) MCG/ACT Inhaler,         fluticasone-salmeterol (ADVAIR) 250-50 MCG/DOSE        diskus inhaler    (E78.5) " Hyperlipidemia LDL goal <130  Comment: Last LDL check was 2014. Started on statins but did not tolerate Lipitor and Pravastatin.  Discussed nonpharmacological interventions including healthy diets/ low-cholesterol diet, exercise and weight loss and the need to recheck cholesterol levels regularly.  Plan: Check LDL when fasting    (Z68.29) BMI 29.0-29.9,adult  Comment: Counseled to make better food choices, exercise as tolerated, and lose weight.     (J30.1) Chronic seasonal allergic rhinitis due to pollen  Comment: Decongestant nasal spray helping  Plan: mometasone (NASONEX) 50 MCG/ACT spray    Follow up in 3 months or sooner with concerns    Tres Sanchez MD  [facility]

## 2018-05-18 ENCOUNTER — OFFICE VISIT (OUTPATIENT)
Dept: FAMILY MEDICINE | Facility: CLINIC | Age: 58
End: 2018-05-18
Payer: COMMERCIAL

## 2018-05-18 VITALS
SYSTOLIC BLOOD PRESSURE: 136 MMHG | WEIGHT: 203 LBS | BODY MASS INDEX: 30.07 KG/M2 | OXYGEN SATURATION: 98 % | HEIGHT: 69 IN | RESPIRATION RATE: 12 BRPM | DIASTOLIC BLOOD PRESSURE: 88 MMHG | HEART RATE: 85 BPM | TEMPERATURE: 97.4 F

## 2018-05-18 DIAGNOSIS — E78.5 HYPERLIPIDEMIA LDL GOAL <130: ICD-10-CM

## 2018-05-18 DIAGNOSIS — J30.1 CHRONIC SEASONAL ALLERGIC RHINITIS DUE TO POLLEN: ICD-10-CM

## 2018-05-18 DIAGNOSIS — J45.30 MILD PERSISTENT ASTHMA WITHOUT COMPLICATION: Primary | ICD-10-CM

## 2018-05-18 PROCEDURE — 99214 OFFICE O/P EST MOD 30 MIN: CPT | Performed by: FAMILY MEDICINE

## 2018-05-18 RX ORDER — MOMETASONE FUROATE MONOHYDRATE 50 UG/1
SPRAY, METERED NASAL
Qty: 17 G | Refills: 11 | Status: SHIPPED | OUTPATIENT
Start: 2018-05-18 | End: 2019-05-15

## 2018-05-18 RX ORDER — ALBUTEROL SULFATE 90 UG/1
1-2 AEROSOL, METERED RESPIRATORY (INHALATION) EVERY 4 HOURS PRN
Qty: 1 INHALER | Refills: 3 | Status: SHIPPED | OUTPATIENT
Start: 2018-05-18

## 2018-05-18 NOTE — MR AVS SNAPSHOT
After Visit Summary   5/18/2018    Home Sandoval    MRN: [id number]           Patient Information     Date Of Birth          1960        Visit Information        Provider Department      5/18/2018 7:20 AM Tres Sanchez MD [facility]        Today's Diagnoses     Mild persistent asthma without complication    -  1    Hyperlipidemia LDL goal <130        BMI 29.0-29.9,adult        Chronic seasonal allergic rhinitis due to pollen          Care Instructions    [facility]    If you have any questions regarding to your visit please contact your care team:       Team Purple:   Clinic Hours Telephone Number   Dr. Randi Escalante   7am-7pm  Monday - Thursday   7am-5pm  Fridays  [phone]  (Appointment scheduling available 24/7)    Questions about your recent visit?   Team Line:  [phone]   Urgent Care - [place] and [facility] - 11am-9pm Monday-Friday Saturday-Sunday- 9am-5pm   [place] - 5pm-9pm Monday-Friday Saturday-Sunday- 9am-5pm  [phone] - [place]  [phone] - [place]       What options do I have for a visit other than an office visit? We offer electronic visits (e-visits) and telephone visits, when medically appropriate.  Please check with your medical insurance to see if these types of visits are covered, as you will be responsible for any charges that are not paid by your insurance.      You can use [employer] (secure electronic communication) to access to your chart, send your primary care provider a message, or make an appointment. Ask a team member how to get started.     For a price quote for your services, please call our Consumer Price Line at [phone] or our Imaging Cost estimation line at [phone] (for imaging tests).    Rubin Wilson            Follow-ups after your visit        Follow-up notes from your care team     Return in about 6 months (around  "11/18/2018).      Who to contact     If you have questions or need follow up information about today's clinic visit or your schedule please contact [facility] [place] directly at [phone].  Normal or non-critical lab and imaging results will be communicated to you by MyChart, letter or phone within 4 business days after the clinic has received the results. If you do not hear from us within 7 days, please contact the clinic through [employer]Gate[employer] or phone. If you have a critical or abnormal lab result, we will notify you by phone as soon as possible.  Submit refill requests through [employer]IMBOXX or call your pharmacy and they will forward the refill request to us. Please allow 3 business days for your refill to be completed.          Additional Information About Your Visit        NIM[employer]XX Information     [employer]IM[employer]XX gives you secure access to your electronic health record. If you see a primary care provider, you can also send messages to your care team and make appointments. If you have questions, please call your primary care clinic.  If you do not have a primary care provider, please call [phone] and they will assist you.        Care EveryWhere ID     This is your Care EveryWhere ID. This could be used by other organizations to access your [facility] medical records  [id number]        Your Vitals Were     Pulse Temperature Respirations Height Pulse Oximetry BMI (Body Mass Index)    85 97.4  F (36.3  C) (Oral) 12 5' 9\" (1.753 m) 98% 29.98 kg/m2       Blood Pressure from Last 3 Encounters:   05/18/18 136/88   05/03/17 (!) 129/94   04/26/17 (!) 140/102    Weight from Last 3 Encounters:   05/18/18 203 lb (92.1 kg)   05/03/17 206 lb (93.4 kg)   04/26/17 206 lb (93.4 kg)              Today, you had the following     No orders found for display         Today's Medication Changes          These changes are accurate as of 5/18/18  8:00 AM.  If you have any questions, ask your nurse or doctor.               These medicines have " changed or have updated prescriptions.        Dose/Directions    fluticasone-salmeterol 250-50 MCG/DOSE diskus inhaler   Commonly known as:  ADVAIR   This may have changed:  See the new instructions.   Used for:  Mild persistent asthma without complication   Changed by:  Tres Sanchez MD        INHALE 1 PUFF INTO THE LUNGS TWICE DAILY   Quantity:  1 Inhaler   Refills:  6       mometasone 50 MCG/ACT spray   Commonly known as:  NASONEX   This may have changed:  See the new instructions.   Used for:  Chronic seasonal allergic rhinitis due to pollen   Changed by:  Tres Sanchez MD        SPRAY 2 SPRAYS INTO BOTH NOSTRILS DAILY   Quantity:  17 g   Refills:  11            Where to get your medicines      These medications were sent to [facility]/pharmacy #[id number] - [place], [place] - [address] [facility] [address] [facility]  [address] [place] [address]     Phone:  [phone]     albuterol 108 (90 Base) MCG/ACT Inhaler    fluticasone-salmeterol 250-50 MCG/DOSE diskus inhaler    mometasone 50 MCG/ACT spray                Primary Care Provider Office Phone # Fax #    Randi La [phone] [address] [facility] [phone]        Equal Access to Services     SANCHEZ RENEE AH: Hadii adolfo dominguez hadasho Soomaali, waaxda luqadaha, qaybta kaalmada adeegyada, waxay jhonny larkin. So [facility] [phone].    ATENCIÓN: Si habla español, tiene a askew disposición servicios gratuitos de asistencia lingüística. Llame al [phone].    We comply with applicable federal civil rights laws and [place] laws. We do not discriminate on the basis of race, color, national origin, age, disability, sex, sexual orientation, or gender identity.            Thank you!     Thank you for choosing [facility]  for your care. Our goal is always to provide you with excellent care. Hearing back from our patients is one way we can continue to improve our services. Please take a few  minutes to complete the written survey that you may receive in the mail after your visit with us. Thank you!             Your Updated Medication List - Protect others around you: Learn how to safely use, store and throw away your medicines at www.disposemymeds.org.          This list is accurate as of 5/18/18  8:00 AM.  Always use your most recent med list.                   Brand Name Dispense Instructions for use Diagnosis    albuterol 108 (90 Base) MCG/ACT Inhaler    PROAIR HFA/PROVENTIL HFA/VENTOLIN HFA    1 Inhaler    Inhale 1-2 puffs into the lungs every 4 hours as needed for shortness of breath / dyspnea    Mild persistent asthma without complication       clobetasol 0.05 % ointment    TEMOVATE    1 g    Apply a small amount to affected twice daily as needed    Dermatitis       fluticasone-salmeterol 250-50 MCG/DOSE diskus inhaler    ADVAIR    1 Inhaler    INHALE 1 PUFF INTO THE LUNGS TWICE DAILY    Mild persistent asthma without complication       mometasone 50 MCG/ACT spray    NASONEX    17 g    SPRAY 2 SPRAYS INTO BOTH NOSTRILS DAILY    Chronic seasonal allergic rhinitis due to pollen       triamcinolone 55 MCG/ACT Inhaler    NASACORT     Spray 2 sprays into both nostrils daily

## 2018-05-18 NOTE — LETTER
My Asthma Action Plan  Name: Home Sandoval   YOB: 1960  Date: 5/18/2018   My doctor: Tres Sanchez MD   My clinic: [facility]        My Control Medicine: Fluticasone + salmeterol (Advair) -  Diskus 250/50 mcg BID  My Rescue Medicine: Albuterol (Proair/Ventolin/Proventil) inhaler .   My Asthma Severity: mild persistent  Avoid your asthma triggers: upper respiratory infections, pollens and mold  None            GREEN ZONE   Good Control    I feel good    No cough or wheeze    Can work, sleep and play without asthma symptoms       Take your asthma control medicine every day.     1. If exercise triggers your asthma, take your rescue medication    15 minutes before exercise or sports, and    During exercise if you have asthma symptoms  2. Spacer to use with inhaler: If you have a spacer, make sure to use it with your inhaler             YELLOW ZONE Getting Worse  I have ANY of these:    I do not feel good    Cough or wheeze    Chest feels tight    Wake up at night   1. Keep taking your Green Zone medications  2. Start taking your rescue medicine:    every 20 minutes for up to 1 hour. Then every 4 hours for 24-48 hours.  3. If you stay in the Yellow Zone for more than 12-24 hours, contact your doctor.  4. If you do not return to the Green Zone in 12-24 hours or you get worse, start taking your oral steroid medicine if prescribed by your provider.           RED ZONE Medical Alert - Get Help  I have ANY of these:    I feel awful    Medicine is not helping    Breathing getting harder    Trouble walking or talking    Nose opens wide to breathe       1. Take your rescue medicine NOW  2. If your provider has prescribed an oral steroid medicine, start taking it NOW  3. Call your doctor NOW  4. If you are still in the Red Zone after 20 minutes and you have not reached your doctor:    Take your rescue medicine again and    Call 911 or go to the emergency room right away    See your regular  doctor within 2 weeks of an Emergency Room or Urgent Care visit for follow-up treatment.          Annual Reminders:  Meet with Asthma Educator,  Flu Shot in the Fall, consider Pneumonia Vaccination for patients with asthma (aged 19 and older).    Pharmacy:    [id number] [place] - [address] [facility]/PHARMACY #[id number] - [place] - [address] [facility]                      Asthma Triggers  How To Control Things That Make Your Asthma Worse    Triggers are things that make your asthma worse.  Look at the list below to help you find your triggers and what you can do about them.  You can help prevent asthma flare-ups by staying away from your triggers.      Trigger                                                          What you can do   Cigarette Smoke  Tobacco smoke can make asthma worse. Do not allow smoking in your home, car or around you.  Be sure no one smokes at a child s day care or school.  If you smoke, ask your health care provider for ways to help you quit.  Ask family members to quit too.  Ask your health care provider for a referral to Quit Plan to help you quit smoking, or call 0-022-973-PLAN.     Colds, Flu, Bronchitis  These are common triggers of asthma. Wash your hands often.  Don t touch your eyes, nose or mouth.  Get a flu shot every year.     Dust Mites  These are tiny bugs that live in cloth or carpet. They are too small to see. Wash sheets and blankets in hot water every week.   Encase pillows and mattress in dust mite proof covers.  Avoid having carpet if you can. If you have carpet, vacuum weekly.   Use a dust mask and HEPA vacuum.   Pollen and Outdoor Mold  Some people are allergic to trees, grass, or weed pollen, or molds. Try to keep your windows closed.  Limit time out doors when pollen count is high.   Ask you health care provider about taking medicine during allergy season.     Animal Dander  Some people are allergic to skin  flakes, urine or saliva from pets with fur or feathers. Keep pets with fur or feathers out of your home.    If you can t keep the pet outdoors, then keep the pet out of your bedroom.  Keep the bedroom door closed.  Keep pets off cloth furniture and away from stuffed toys.     Mice, Rats, and Cockroaches  Some people are allergic to the waste from these pests.   Cover food and garbage.  Clean up spills and food crumbs.  Store grease in the refrigerator.   Keep food out of the bedroom.   Indoor Mold  This can be a trigger if your home has high moisture. Fix leaking faucets, pipes, or other sources of water.   Clean moldy surfaces.  Dehumidify basement if it is damp and smelly.   Smoke, Strong Odors, and Sprays  These can reduce air quality. Stay away from strong odors and sprays, such as perfume, powder, hair spray, paints, smoke incense, paint, cleaning products, candles and new carpet.   Exercise or Sports  Some people with asthma have this trigger. Be active!  Ask your doctor about taking medicine before sports or exercise to prevent symptoms.    Warm up for 5-10 minutes before and after sports or exercise.     Other Triggers of Asthma  Cold air:  Cover your nose and mouth with a scarf.  Sometimes laughing or crying can be a trigger.  Some medicines and food can trigger asthma.

## 2018-05-18 NOTE — NURSING NOTE
"Chief Complaint   Patient presents with     Recheck Medication     Asthma     Health Maintenance     ACT, PHQ2     Initial /88 (BP Location: Left arm, Patient Position: Chair, Cuff Size: Adult Regular)  Pulse 85  Temp 97.4  F (36.3  C) (Oral)  Resp 12  Ht 5' 9\" (1.753 m)  Wt 203 lb (92.1 kg)  SpO2 98%  BMI 29.98 kg/m2 Estimated body mass index is 29.98 kg/(m^2) as calculated from the following:    Height as of this encounter: 5' 9\" (1.753 m).    Weight as of this encounter: 203 lb (92.1 kg).  BP completed using cuff size: regular    Rubin Wilson  "

## 2018-05-18 NOTE — PATIENT INSTRUCTIONS
[facility]    If you have any questions regarding to your visit please contact your care team:       Team Purple:   Clinic Hours Telephone Number   Dr. Randi Escalante   7am-7pm  Monday - Thursday   7am-5pm  Fridays  [phone]  (Appointment scheduling available 24/7)    Questions about your recent visit?   Team Line:  [phone]   Urgent Care - [place] and [facility] - 11am-9pm Monday-Friday Saturday-Sunday- 9am-5pm   [place] - 5pm-9pm Monday-Friday Saturday-Sunday- 9am-5pm  [phone] - [place]  [phone] - [place]       What options do I have for a visit other than an office visit? We offer electronic visits (e-visits) and telephone visits, when medically appropriate.  Please check with your medical insurance to see if these types of visits are covered, as you will be responsible for any charges that are not paid by your insurance.      You can use [employer] (secure electronic communication) to access to your chart, send your primary care provider a message, or make an appointment. Ask a team member how to get started.     For a price quote for your services, please call our Consumer Price Line at [phone] or our Imaging Cost estimation line at [phone] (for imaging tests).    Rubin Wilson

## 2018-05-19 ASSESSMENT — ASTHMA QUESTIONNAIRES: ACT_TOTALSCORE: 24

## 2018-12-26 DIAGNOSIS — J45.30 MILD PERSISTENT ASTHMA WITHOUT COMPLICATION: ICD-10-CM

## 2018-12-26 NOTE — TELEPHONE ENCOUNTER
Pending Prescriptions:                       Disp   Refills    fluticasone-salmeterol (ADVAIR DISKUS) 25*       6            Sig: INHALE 1 PUFF INTO THE LUNGS TWICE DAILY    Routing refill request to provider for review/approval because:  Patient needs to be seen because:  Last office visit 5/18/18, patient asked to follow up in 3 mos.    Eryn Sales RN

## 2019-01-02 DIAGNOSIS — J45.30 MILD PERSISTENT ASTHMA WITHOUT COMPLICATION: ICD-10-CM

## 2019-01-02 NOTE — TELEPHONE ENCOUNTER
Patient scheduled appointment with ALEX Taylor for Mon Jan 7 in the am, please refill his medications he is out. Follow up with patient and questions.

## 2019-01-02 NOTE — TELEPHONE ENCOUNTER
Reason for call:  Medication   If this is a refill request, has the caller requested the refill from the pharmacy already? Yes  Will the patient be using a [facility]? No  Name of the pharmacy and phone number for the current request: [facility]Y #[id number] - [place], [place] - [address] [facility]    Name of the medication requested: fluticasone-salmeterol (ADVAIR) 250-50 MCG/DOSE diskus inhaler    Other request: Patient has requested medication be refilled. Please contact with next steps.    Phone number to reach patient:  Cell number on file:    Telephone Information:   Mobile [phone]       Best Time:  Anytime    Can we leave a detailed message on this number?  YES

## 2019-01-02 NOTE — TELEPHONE ENCOUNTER
Please call and notify rx sent for carolyn but needs to keep appt for any more refills.    Marianne Westbrook RN

## 2019-01-02 NOTE — TELEPHONE ENCOUNTER
Spoke to patient and informed him of below message. Patient expressed his frustration that he was in clinic in May and now has to come back. He stated he will switch to a different clinic if he has to keep coming back every 3-6 months.  Any TAVAREZ CMA ([facility])

## 2019-01-09 NOTE — PROGRESS NOTES
SUBJECTIVE:   Home Sandoval is a 58 year old male who presents to clinic today for the following health issues:  Chief Complaint   Patient presents with     Asthma     Ear Plugs     left     Asthma Follow-Up    Was ACT completed today?    Yes    ACT Total Scores 5/18/2018   ACT TOTAL SCORE -   ASTHMA ER VISITS -   ASTHMA HOSPITALIZATIONS -   ACT TOTAL SCORE (Goal Greater than or Equal to 20) 24   In the past 12 months, how many times did you visit the emergency room for your asthma without being admitted to the hospital? 0   In the past 12 months, how many times were you hospitalized overnight because of your asthma? 0       Recent asthma triggers that patient is dealing with: None      Amount of exercise or physical activity: None    Problems taking medications regularly: No    Medication side effects: none    Diet: regular (no restrictions)    Was seen in Urgent Care on Sunday for sinus infection and treated with Augmentin- left ear had fluid in it at the time. Can't hear out of the left ear now.    BP elevated- no headache (did take Ibuprofen this morning) or CP. Taking Allegra currently- no decongestants over the counter. History of pulmonary valve stenosis in childhood- had cardiac cath at age 3-4. Was followed at the  of  and Cardiology stated he no longer needed surveillance. Per chart also has LVH- no echo on file- has been many years.    Cholesterol was high in 2014- tried 2 statins but did not tolerate due to diarrhea.      Problem list and histories reviewed & adjusted, as indicated.  Additional history: as documented    Patient Active Problem List   Diagnosis     LVH (left ventricular hypertrophy)     Mild persistent asthma     Pulmonary stenosis     Erectile dysfunction     Elevated fasting glucose     Hyperlipidemia LDL goal <160     Dermatitis     Seasonal allergic rhinitis     Pseudophakia, od     Past Surgical History:   Procedure Laterality Date     APPENDECTOMY  1987     HEART CATH CHILD   "1964     PHACOEMULSIFICATION WITH STANDARD INTRAOCULAR LENS IMPLANT  2014    right eye     SINUS SURGERY  2002       Social History     Tobacco Use     Smoking status: Former Smoker     Types: Cigars     Last attempt to quit: 2000     Years since quittin.1     Smokeless tobacco: Never Used   Substance Use Topics     Alcohol use: Yes     Comment: Seldom alcohol use     Family History   Problem Relation Age of Onset     C.A.D. Father         CABG     Neurologic Disorder Father         ischemic brain disease     Cerebrovascular Disease Father      Eye Disorder Sister      Asthma Mother      C.A.D. Mother         CABG     Heart Disease Maternal Grandmother      Heart Disease Maternal Grandfather      Diabetes Paternal Grandmother      Diabetes Paternal Grandfather      Cancer Paternal Uncle         esophageal           Reviewed and updated as needed this visit by clinical staff  Tobacco  Allergies  Meds  Med Hx  Surg Hx  Fam Hx  Soc Hx      Reviewed and updated as needed this visit by Provider         ROS:  Constitutional, HEENT, cardiovascular, pulmonary, GI, , musculoskeletal, neuro, skin, endocrine and psych systems are negative, except as otherwise noted.    OBJECTIVE:     BP (!) 168/110   Pulse 85   Temp 98.7  F (37.1  C) (Oral)   Resp 22   Ht 1.753 m (5' 9\")   Wt 97.2 kg (214 lb 3.2 oz)   SpO2 99%   BMI 31.63 kg/m    Body mass index is 31.63 kg/m .  GENERAL: healthy, alert and no distress  EYES: Eyes grossly normal to inspection, PERRL and conjunctivae and sclerae normal  HENT: normal cephalic/atraumatic, right ear: clear effusion, left ear: normal: no effusions, no erythema, normal landmarks, oropharynx clear and oral mucous membranes moist  NECK: no adenopathy, no asymmetry, masses, or scars and thyroid normal to palpation  RESP: lungs clear to auscultation - no rales, rhonchi or wheezes  CV: regular rate and rhythm, grade 2/3 systolic murmur, no click or rub, no peripheral edema " and peripheral pulses strong    Diagnostic Test Results:  Results for orders placed or performed in visit on 01/16/19 (from the past 24 hour(s))   Hemoglobin A1c   Result Value Ref Range    Hemoglobin A1C 5.2 0 - 5.6 %       ASSESSMENT/PLAN:     1. Mild persistent asthma without complication  Stable, continue Advair without change  - fluticasone-salmeterol (ADVAIR) 250-50 MCG/DOSE inhaler; INHALE 1 PUFF INTO THE LUNGS TWICE DAILY  Dispense: 1 Inhaler; Refill: 11    2. OME (otitis media with effusion), right  Interestingly, most symptoms are on left but right ear worse on exam. Continue Nasacort and Augmentin. Has upcoming air travel- can use Afrin before flight.     3. Hyperlipidemia LDL goal <160    - Lipid panel reflex to direct LDL Non-fasting  - Hemoglobin A1c    4. Pulmonary valve stenosis, unspecified etiology  Recommend echo for surveillance, particularly due to elevated blood pressures and history of LVH, pulmonic stenosis.   - Echocardiogram Complete; Future    5. Elevated blood pressure reading without diagnosis of hypertension  Needs BP check within the month- patient declines to do here and will see provider through his employer. Asked him to send me a copy of results.      See Patient Instructions    DANNY Todd [facility]

## 2019-01-16 ENCOUNTER — OFFICE VISIT (OUTPATIENT)
Dept: FAMILY MEDICINE | Facility: CLINIC | Age: 59
End: 2019-01-16
Payer: COMMERCIAL

## 2019-01-16 VITALS
WEIGHT: 214.2 LBS | TEMPERATURE: 98.7 F | OXYGEN SATURATION: 99 % | BODY MASS INDEX: 31.73 KG/M2 | DIASTOLIC BLOOD PRESSURE: 110 MMHG | HEART RATE: 85 BPM | SYSTOLIC BLOOD PRESSURE: 168 MMHG | RESPIRATION RATE: 22 BRPM | HEIGHT: 69 IN

## 2019-01-16 DIAGNOSIS — R03.0 ELEVATED BLOOD PRESSURE READING WITHOUT DIAGNOSIS OF HYPERTENSION: ICD-10-CM

## 2019-01-16 DIAGNOSIS — H65.91 OME (OTITIS MEDIA WITH EFFUSION), RIGHT: ICD-10-CM

## 2019-01-16 DIAGNOSIS — I37.0 PULMONARY VALVE STENOSIS, UNSPECIFIED ETIOLOGY: ICD-10-CM

## 2019-01-16 DIAGNOSIS — E78.5 HYPERLIPIDEMIA LDL GOAL <160: ICD-10-CM

## 2019-01-16 DIAGNOSIS — J45.30 MILD PERSISTENT ASTHMA WITHOUT COMPLICATION: Primary | ICD-10-CM

## 2019-01-16 LAB — HBA1C MFR BLD: 5.2 % (ref 0–5.6)

## 2019-01-16 PROCEDURE — 80061 LIPID PANEL: CPT | Performed by: NURSE PRACTITIONER

## 2019-01-16 PROCEDURE — 36415 COLL VENOUS BLD VENIPUNCTURE: CPT | Performed by: NURSE PRACTITIONER

## 2019-01-16 PROCEDURE — 83036 HEMOGLOBIN GLYCOSYLATED A1C: CPT | Performed by: NURSE PRACTITIONER

## 2019-01-16 PROCEDURE — 99214 OFFICE O/P EST MOD 30 MIN: CPT | Performed by: NURSE PRACTITIONER

## 2019-01-16 ASSESSMENT — MIFFLIN-ST. JEOR: SCORE: 1781.98

## 2019-01-16 NOTE — PATIENT INSTRUCTIONS
You can safely take Coricidin HBP    Send me your blood pressure reading in 2 weeks once you're over the illness.

## 2019-01-17 ENCOUNTER — MYC MEDICAL ADVICE (OUTPATIENT)
Dept: FAMILY MEDICINE | Facility: CLINIC | Age: 59
End: 2019-01-17

## 2019-01-17 DIAGNOSIS — E78.5 HYPERLIPIDEMIA LDL GOAL <160: Primary | ICD-10-CM

## 2019-01-17 LAB
CHOLEST SERPL-MCNC: 274 MG/DL
HDLC SERPL-MCNC: 53 MG/DL
LDLC SERPL CALC-MCNC: 171 MG/DL
NONHDLC SERPL-MCNC: 221 MG/DL
TRIGL SERPL-MCNC: 249 MG/DL

## 2019-01-17 ASSESSMENT — ASTHMA QUESTIONNAIRES: ACT_TOTALSCORE: 25

## 2019-01-18 RX ORDER — ROSUVASTATIN CALCIUM 5 MG/1
5 TABLET, COATED ORAL DAILY
Qty: 90 TABLET | Refills: 3 | Status: SHIPPED | OUTPATIENT
Start: 2019-01-18 | End: 2019-03-18

## 2019-01-18 NOTE — TELEPHONE ENCOUNTER
Please advise on [employer] message.   Patient had OV yesterday (1/17/19), was previously on statins but stopped d/t diarrhea.     Marianne Westbrook RN

## 2019-02-28 NOTE — PROGRESS NOTES
SUBJECTIVE:   Home Sandoval is a 58 year old male who presents to clinic today for the following health issues:      Chief Complaint   Patient presents with     RECHECK     BP and check foot     Health Maintenance     HIV     HTN - has been elevated at home.  Last week he was at his work clinic at which time it was 150/110.  Patient has been asymptomatic.  At home bp has been around 130/95.    Left foot pain - left foot lesion at distal sole of foot, which has been present for the last few years, recently inflamed/infected and improved with soaking.  No other treatments tried.  No pain currently when walking on it, however had pain previously.    Problem list and histories reviewed & adjusted, as indicated.  Additional history: as documented    BP Readings from Last 3 Encounters:   03/04/19 (!) 138/92   01/16/19 (!) 168/110   05/18/18 136/88    Wt Readings from Last 3 Encounters:   03/04/19 97.1 kg (214 lb)   01/16/19 97.2 kg (214 lb 3.2 oz)   05/18/18 92.1 kg (203 lb)        Reviewed and updated as needed this visit by clinical staff  Tobacco  Allergies  Meds  Problems  Med Hx  Surg Hx  Fam Hx       Reviewed and updated as needed this visit by Provider  Tobacco  Allergies  Meds  Problems  Med Hx  Surg Hx  Fam Hx         ROS:  Constitutional, HEENT, cardiovascular, pulmonary, gi and gu systems are negative, except as otherwise noted.    OBJECTIVE:     BP (!) 138/92   Pulse 92   Temp 98.6  F (37  C) (Oral)   Resp 16   Wt 97.1 kg (214 lb)   SpO2 98%   BMI 31.60 kg/m    Body mass index is 31.6 kg/m .  GENERAL: healthy, alert and no distress  EYES: Eyes grossly normal to inspection, PERRL and conjunctivae and sclerae normal  NECK: no adenopathy, no asymmetry, masses, or scars and thyroid normal to palpation  RESP: lungs clear to auscultation - no rales, rhonchi or wheezes  CV: regular rate and rhythm, normal S1 S2, no S3 or S4, no murmur, click or rub, no peripheral edema and peripheral pulses  strong  SKIN: plantar wart 1cm diameter nontender no inflammation  PSYCH: mentation appears normal, affect normal/bright    ASSESSMENT/PLAN:       ICD-10-CM    1. Uncontrolled hypertension I10 lisinopril (PRINIVIL/ZESTRIL) 10 MG tablet   2. Plantar wart B07.0        Patient Instructions       Patient Education     Home     It was a pleasure seeing you in clinic today.  Here is an outline of the plan:    1.  Hypertension - I have prescribed lisinopril 10mg for you to take once per day.  Keep monitoring your blood pressure at home to assess whether the Bp normalizes.  I would follow-up in about 1 month to recheck and potentially adjust your medication dose if needed.  Bring your Bp log to the follow-up appointment.    2.  Plantar wart - use salicylic acid pads nightly.  If the wart doesn't improve within the next 8 weeks, let us know and we'll have you see a podiatrist for removal.    Gavino Escalante MD           Understanding Plantar Warts    A plantar wart is a small noncancerous growth on the bottom of the foot. Plantar warts often develop where friction or pressure occurs, such as on the ball of the foot. The word plantar refers to the sole of the foot. Similar warts can occur on other areas of the body such as the hands. Plantar warts are more common in children and young adults.  What causes a plantar wart?  Plantar warts are caused by a virus called human papillomavirus (HPV).  They can be spread by person-to-person contact. Or you can develop one if you walk barefoot on moist surfaces infected with the virus, such as in a community pool area or locker rooms. Wearing proper footwear in such places can prevent them.  What are the symptoms of plantar warts?  Plantar warts cause a thick patch of skin on the bottom of the foot. The wart may have black dots on it. These dots are dried blood. The wart may cause pain or discomfort. You may also have trouble walking because of the pain.  How are plantar warts  treated?  Many plantar warts go away without any treatment. But for those that are painful or that don t go away, several treatments are available. These include:    Salicylic acid. This treatment is applied directly to the wart. It may come in the form of a liquid, ointment, pad, or patch. It is available over the counter.    Cryotherapy.  Your healthcare provider puts liquid nitrogen on the wart with a cotton swab. This treatment can be painful.    Duct tape. One study shows a benefit by putting duct tape on the wart for 6 days. You then soak the wart and scrape it with an emery board. This is repeated until the wart is gone or for 2 months. Other studies show this does not work well to remove the wart.    Medicine. A variety of medicines can be put on or injected into the wart. But research is mixed on how well they work.  Often your healthcare provider will cut away dead parts of the wart before also using one of these other treatments.    When should I call my healthcare provider?  Call your healthcare provider if you have plantar warts that become too painful and do not go away on their own or with over-the-counter and at home treatments.   Date Last Reviewed: 3/30/2016    3640-4550 [employer]. [address] [place] [address], [place] [address]. All rights reserved. This information is not intended as a substitute for professional medical care. Always follow your healthcare professional's instructions.           Patient Education     Patient Education    Behentrimonium methosulfate, Carbomers, Ceramide 6, cetearyl alcohol, Cetyl Alcohol, Cholesterol, Distilled Water, Glycerin, Glyceryl monostearate, Hyaluronic Acid , Methylparaben, Phytosphingosine, Polyoxyethylene Stearates, Polysorbate 20, Potassium phosphate, Potassium Phosphate, Dibasic, Propylparaben, Sodium Lauroyl Lactylate, Stearyl Alcohol, tetrasodium EDTA, Xanthan Gum Topical suspension, cleanser, Salicylic Acid Topical cream    Behentrimonium  methosulfate, Carbomers, Ceramide 6, cetearyl alcohol, Cetyl Alcohol, Cholesterol, Distilled Water, Glycerin, Glyceryl monostearate, Hyaluronic Acid , Methylparaben, Phytosphingosine, Polyoxyethylene Stearates, Polysorbate 20, Potassium phosphate, Potassium Phosphate, Dibasic, Propylparaben, Sodium Lauroyl Lactylate, Stearyl Alcohol, tetrasodium EDTA, Xanthan Gum Topical suspension, cleanser, Salicylic Acid Topical lotion    carbomer 940, Ceramide 6, cetearyl alcohol, Cetyl Alcohol, Cholesterol, Distilled Water, Glycerin, Glyceryl monostearate, Hyaluronic Acid , Methylparaben, Phytosphingosine, Polyoxyethylene Stearates, Polysorbate 20, Polysorbate 60, Potassium phosphate, Propylparaben, Sodium Lauroyl Lactylate, Stearyl Alcohol, tetrasodium EDTA, Xanthan Gum Topical suspension, cleanser, Salicylic Acid Topical cream    Carbomers, Ceramide 6, Cetyl Alcohol, Cholesterol, Distearyldimonium Chloride , Distilled Water, Glycerin, Glyceryl monostearate, Hyaluronic Acid , Methylparaben, Phytosphingosine, Polyoxyethylene Stearates, Polysorbate 80, Potassium Phosphate, Dibasic, Propylparaben, Sodium Lauroyl Lactylate, Stearyl Alcohol, tetrasodium EDTA, Xanthan Gum Topical suspension, cleanser, Salicylic Acid Topical lotion    Salicylic Acid Medicated topical patch    Salicylic Acid Shampoo    Salicylic Acid Topical cream    Salicylic Acid Topical foam    Salicylic Acid Topical gel    Salicylic Acid Topical lotion    Salicylic Acid Topical pad, cleanser    Salicylic Acid Topical solution    Salicylic Acid Topical suspension, cleanser  Salicylic Acid Medicated topical patch  What is this medicine?  SALICYCLIC ACID (SAL i MONET ik AS id) breaks down layers of thick skin. It is used to treat common and plantar warts, psoriasis, calluses, and corns.  This medicine may be used for other purposes; ask your health care provider or pharmacist if you have questions.  What should I tell my health care provider before I take this  medicine?  They need to know if you have any of these conditions:    child with chickenpox, the flu, or other viral infection    kidney disease    liver disease    an unusual or allergic reaction to salicylic acid, other medicines, foods, dyes, or preservatives    pregnant or trying to get pregnant    breast-feeding  How should I use this medicine?  This medicine is for external use only. Follow the directions on the label. Do not apply to raw or irritated skin. Avoid getting medicine in your eyes, lips, nose, mouth, or other sensitive areas. Use this medicine at regular intervals. Do not use more often than directed.  Talk to your pediatrician regarding the use of this medicine in children. Special care may be needed. This medicine is not approved for use in children under 2 years old.  Overdosage: If you think you have taken too much of this medicine contact a poison control center or emergency room at once.  NOTE: This medicine is only for you. Do not share this medicine with others.  What if I miss a dose?  If you miss a dose, use it as soon as you can. If it is almost time for your next dose, use only that dose. Do not use double or extra doses.  What may interact with this medicine?    medicines that change urine pH like sodium bicarbonate, ammonium chloride and others    medicines that treat or prevent blood clots like warfarin    methotrexate    pyrazinamide    some medicines for diabetes    some medicines for gout    steroid medicines like prednisone or cortisone  This list may not describe all possible interactions. Give your health care provider a list of all the medicines, herbs, non-prescription drugs, or dietary supplements you use. Also tell them if you smoke, drink alcohol, or use illegal drugs. Some items may interact with your medicine.  What should I watch for while using this medicine?  Tell your doctor or healthcare professional if your symptoms do not start to get better or if they get  worse.  This medicine can make you more sensitive to the sun. Keep out of the sun. If you cannot avoid being in the sun, wear protective clothing and use sunscreen. Do not use sun lamps or tanning beds/booths.  Use of this medicine in children under 12 years or in patients with kidney or liver disease may increase the risk of serious side effects. These patients should not use this medicine over large areas of skin. If you notice symptoms such as nausea, vomiting, dizziness, loss of hearing, ringing in the ears, unusual weakness or tiredness, fast or labored breathing, diarrhea, or confusion, stop using this medicine and contact your doctor or health care professional.  What side effects may I notice from receiving this medicine?  Side effects that you should report to your doctor or health care professional as soon as possible:    allergic reactions like skin rash, itching or hives, swelling of the face, lips, or tongue  Side effects that usually do not require medical attention (report to your doctor or health care professional if they continue or are bothersome):    skin irritation  This list may not describe all possible side effects. Call your doctor for medical advice about side effects. You may report side effects to FDA at 5-728-FDA-5660.  Where should I keep my medicine?  Keep out of the reach of children.  Store at room temperature between 15 and 30 degrees C (59 and 86 degrees F). Do not freeze. Throw away any unused medicine after the expiration date.  NOTE:This sheet is a summary. It may not cover all possible information. If you have questions about this medicine, talk to your doctor, pharmacist, or health care provider. Copyright  2016 [employer]           Patient Education     Plantar Warts  Warts are common skin growths that can appear anywhere on the body. Warts on the soles of the feet are called plantar warts. These warts are not a serious health problem. They usually go away without  treatment. But plantar warts can be painful when you stand or walk. If this is the case, special cushions can help relieve pressure and pain. Drugstores carry these cushions and you can buy them without a prescription. If cushions don't work and the pain interferes with walking, the wart can be removed.  General care    Your healthcare provider may remove the plantar wart:  ? With prescription medicines. These may be placed directly on the wart at each office visit. Or you may be sent home with the medicine.  ? With a blade, or by freezing (cryotherapy), burning (electrocautery), or laser treatment.    You may be instructed to treat the wart yourself at home using an over-the-counter wart-removal medicine (such as 40% salicylic acid). Apply the medicine to the wart every day as directed. Don't apply the medicine to the healthy skin around the wart. In between applications, remove the dead wart tissue using the type of file suggested by your healthcare provider. You will likely need to repeat this process for several weeks to remove the entire wart.    Warts can spread from your foot to other parts of your body and to other people. Don't scratch or pick at the wart. Wash your hands well before and after touching your warts. If your child has warts, be certain to teach him or her proper hand washing techniques as well.    While many home remedies are suggested for warts, it's best to check with your healthcare provider before trying them.    Warts often come back, even after successful treatment. Return promptly for treatment of any new warts.  Follow-up care  Follow up with your healthcare provider, or as advised.     When to seek medical advice    Signs of infection (red streaks, pus, smelly or colored discharge, or fever) appear    You have heavy bleeding or bleeding that won t stop with light pressure    The wart doesn t go away after several weeks of self-care    New warts appear on feet, hands, or face          Date Last Reviewed: 5/1/2018 2000-2018 [employer]. [address] [place] [address], [place] [address]. All rights reserved. This information is not intended as a substitute for professional medical care. Always follow your healthcare professional's instructions.               Gavino Escalante MD  [facility]

## 2019-03-04 ENCOUNTER — OFFICE VISIT (OUTPATIENT)
Dept: FAMILY MEDICINE | Facility: CLINIC | Age: 59
End: 2019-03-04
Payer: COMMERCIAL

## 2019-03-04 VITALS
BODY MASS INDEX: 31.6 KG/M2 | HEART RATE: 92 BPM | SYSTOLIC BLOOD PRESSURE: 138 MMHG | OXYGEN SATURATION: 98 % | TEMPERATURE: 98.6 F | WEIGHT: 214 LBS | DIASTOLIC BLOOD PRESSURE: 92 MMHG | RESPIRATION RATE: 16 BRPM

## 2019-03-04 DIAGNOSIS — B07.0 PLANTAR WART: ICD-10-CM

## 2019-03-04 DIAGNOSIS — I10 UNCONTROLLED HYPERTENSION: Primary | ICD-10-CM

## 2019-03-04 PROCEDURE — 99214 OFFICE O/P EST MOD 30 MIN: CPT | Performed by: FAMILY MEDICINE

## 2019-03-04 RX ORDER — FEXOFENADINE HCL 60 MG/1
60 TABLET, FILM COATED ORAL
COMMUNITY

## 2019-03-04 RX ORDER — LISINOPRIL 10 MG/1
10 TABLET ORAL DAILY
Qty: 30 TABLET | Refills: 2 | Status: SHIPPED | OUTPATIENT
Start: 2019-03-04 | End: 2019-04-10

## 2019-03-04 NOTE — PATIENT INSTRUCTIONS
Patient Education     Home     It was a pleasure seeing you in clinic today.  Here is an outline of the plan:    1.  Hypertension - I have prescribed lisinopril 10mg for you to take once per day.  Keep monitoring your blood pressure at home to assess whether the Bp normalizes.  I would follow-up in about 1 month to recheck and potentially adjust your medication dose if needed.  Bring your Bp log to the follow-up appointment.    2.  Plantar wart - use salicylic acid pads nightly.  If the wart doesn't improve within the next 8 weeks, let us know and we'll have you see a podiatrist for removal.    Gavino Escalante MD           Understanding Plantar Warts    A plantar wart is a small noncancerous growth on the bottom of the foot. Plantar warts often develop where friction or pressure occurs, such as on the ball of the foot. The word plantar refers to the sole of the foot. Similar warts can occur on other areas of the body such as the hands. Plantar warts are more common in children and young adults.  What causes a plantar wart?  Plantar warts are caused by a virus called human papillomavirus (HPV).  They can be spread by person-to-person contact. Or you can develop one if you walk barefoot on moist surfaces infected with the virus, such as in a community pool area or locker rooms. Wearing proper footwear in such places can prevent them.  What are the symptoms of plantar warts?  Plantar warts cause a thick patch of skin on the bottom of the foot. The wart may have black dots on it. These dots are dried blood. The wart may cause pain or discomfort. You may also have trouble walking because of the pain.  How are plantar warts treated?  Many plantar warts go away without any treatment. But for those that are painful or that don t go away, several treatments are available. These include:    Salicylic acid. This treatment is applied directly to the wart. It may come in the form of a liquid, ointment, pad, or patch. It  is available over the counter.    Cryotherapy.  Your healthcare provider puts liquid nitrogen on the wart with a cotton swab. This treatment can be painful.    Duct tape. One study shows a benefit by putting duct tape on the wart for 6 days. You then soak the wart and scrape it with an emery board. This is repeated until the wart is gone or for 2 months. Other studies show this does not work well to remove the wart.    Medicine. A variety of medicines can be put on or injected into the wart. But research is mixed on how well they work.  Often your healthcare provider will cut away dead parts of the wart before also using one of these other treatments.    When should I call my healthcare provider?  Call your healthcare provider if you have plantar warts that become too painful and do not go away on their own or with over-the-counter and at home treatments.   Date Last Reviewed: 3/30/2016    1889-5002 [employer]. [address] [place] [address]. All rights reserved. This information is not intended as a substitute for professional medical care. Always follow your healthcare professional's instructions.           Patient Education     Patient Education    Behentrimonium methosulfate, Carbomers, Ceramide 6, cetearyl alcohol, Cetyl Alcohol, Cholesterol, Distilled Water, Glycerin, Glyceryl monostearate, Hyaluronic Acid , Methylparaben, Phytosphingosine, Polyoxyethylene Stearates, Polysorbate 20, Potassium phosphate, Potassium Phosphate, Dibasic, Propylparaben, Sodium Lauroyl Lactylate, Stearyl Alcohol, tetrasodium EDTA, Xanthan Gum Topical suspension, cleanser, Salicylic Acid Topical cream    Behentrimonium methosulfate, Carbomers, Ceramide 6, cetearyl alcohol, Cetyl Alcohol, Cholesterol, Distilled Water, Glycerin, Glyceryl monostearate, Hyaluronic Acid , Methylparaben, Phytosphingosine, Polyoxyethylene Stearates, Polysorbate 20, Potassium phosphate, Potassium Phosphate, Dibasic, Propylparaben,  Sodium Lauroyl Lactylate, Stearyl Alcohol, tetrasodium EDTA, Xanthan Gum Topical suspension, cleanser, Salicylic Acid Topical lotion    carbomer 940, Ceramide 6, cetearyl alcohol, Cetyl Alcohol, Cholesterol, Distilled Water, Glycerin, Glyceryl monostearate, Hyaluronic Acid , Methylparaben, Phytosphingosine, Polyoxyethylene Stearates, Polysorbate 20, Polysorbate 60, Potassium phosphate, Propylparaben, Sodium Lauroyl Lactylate, Stearyl Alcohol, tetrasodium EDTA, Xanthan Gum Topical suspension, cleanser, Salicylic Acid Topical cream    Carbomers, Ceramide 6, Cetyl Alcohol, Cholesterol, Distearyldimonium Chloride , Distilled Water, Glycerin, Glyceryl monostearate, Hyaluronic Acid , Methylparaben, Phytosphingosine, Polyoxyethylene Stearates, Polysorbate 80, Potassium Phosphate, Dibasic, Propylparaben, Sodium Lauroyl Lactylate, Stearyl Alcohol, tetrasodium EDTA, Xanthan Gum Topical suspension, cleanser, Salicylic Acid Topical lotion    Salicylic Acid Medicated topical patch    Salicylic Acid Shampoo    Salicylic Acid Topical cream    Salicylic Acid Topical foam    Salicylic Acid Topical gel    Salicylic Acid Topical lotion    Salicylic Acid Topical pad, cleanser    Salicylic Acid Topical solution    Salicylic Acid Topical suspension, cleanser  Salicylic Acid Medicated topical patch  What is this medicine?  SALICYCLIC ACID (SAL i MONET ik AS id) breaks down layers of thick skin. It is used to treat common and plantar warts, psoriasis, calluses, and corns.  This medicine may be used for other purposes; ask your health care provider or pharmacist if you have questions.  What should I tell my health care provider before I take this medicine?  They need to know if you have any of these conditions:    child with chickenpox, the flu, or other viral infection    kidney disease    liver disease    an unusual or allergic reaction to salicylic acid, other medicines, foods, dyes, or preservatives    pregnant or trying to get  pregnant    breast-feeding  How should I use this medicine?  This medicine is for external use only. Follow the directions on the label. Do not apply to raw or irritated skin. Avoid getting medicine in your eyes, lips, nose, mouth, or other sensitive areas. Use this medicine at regular intervals. Do not use more often than directed.  Talk to your pediatrician regarding the use of this medicine in children. Special care may be needed. This medicine is not approved for use in children under 2 years old.  Overdosage: If you think you have taken too much of this medicine contact a poison control center or emergency room at once.  NOTE: This medicine is only for you. Do not share this medicine with others.  What if I miss a dose?  If you miss a dose, use it as soon as you can. If it is almost time for your next dose, use only that dose. Do not use double or extra doses.  What may interact with this medicine?    medicines that change urine pH like sodium bicarbonate, ammonium chloride and others    medicines that treat or prevent blood clots like warfarin    methotrexate    pyrazinamide    some medicines for diabetes    some medicines for gout    steroid medicines like prednisone or cortisone  This list may not describe all possible interactions. Give your health care provider a list of all the medicines, herbs, non-prescription drugs, or dietary supplements you use. Also tell them if you smoke, drink alcohol, or use illegal drugs. Some items may interact with your medicine.  What should I watch for while using this medicine?  Tell your doctor or healthcare professional if your symptoms do not start to get better or if they get worse.  This medicine can make you more sensitive to the sun. Keep out of the sun. If you cannot avoid being in the sun, wear protective clothing and use sunscreen. Do not use sun lamps or tanning beds/booths.  Use of this medicine in children under 12 years or in patients with kidney or liver  disease may increase the risk of serious side effects. These patients should not use this medicine over large areas of skin. If you notice symptoms such as nausea, vomiting, dizziness, loss of hearing, ringing in the ears, unusual weakness or tiredness, fast or labored breathing, diarrhea, or confusion, stop using this medicine and contact your doctor or health care professional.  What side effects may I notice from receiving this medicine?  Side effects that you should report to your doctor or health care professional as soon as possible:    allergic reactions like skin rash, itching or hives, swelling of the face, lips, or tongue  Side effects that usually do not require medical attention (report to your doctor or health care professional if they continue or are bothersome):    skin irritation  This list may not describe all possible side effects. Call your doctor for medical advice about side effects. You may report side effects to FDA at 7-624-FDA-7332.  Where should I keep my medicine?  Keep out of the reach of children.  Store at room temperature between 15 and 30 degrees C (59 and 86 degrees F). Do not freeze. Throw away any unused medicine after the expiration date.  NOTE:This sheet is a summary. It may not cover all possible information. If you have questions about this medicine, talk to your doctor, pharmacist, or health care provider. Copyright  2016 [employer]           Patient Education     Plantar Warts  Warts are common skin growths that can appear anywhere on the body. Warts on the soles of the feet are called plantar warts. These warts are not a serious health problem. They usually go away without treatment. But plantar warts can be painful when you stand or walk. If this is the case, special cushions can help relieve pressure and pain. Drugstores carry these cushions and you can buy them without a prescription. If cushions don't work and the pain interferes with walking, the wart can be  removed.  General care    Your healthcare provider may remove the plantar wart:  ? With prescription medicines. These may be placed directly on the wart at each office visit. Or you may be sent home with the medicine.  ? With a blade, or by freezing (cryotherapy), burning (electrocautery), or laser treatment.    You may be instructed to treat the wart yourself at home using an over-the-counter wart-removal medicine (such as 40% salicylic acid). Apply the medicine to the wart every day as directed. Don't apply the medicine to the healthy skin around the wart. In between applications, remove the dead wart tissue using the type of file suggested by your healthcare provider. You will likely need to repeat this process for several weeks to remove the entire wart.    Warts can spread from your foot to other parts of your body and to other people. Don't scratch or pick at the wart. Wash your hands well before and after touching your warts. If your child has warts, be certain to teach him or her proper hand washing techniques as well.    While many home remedies are suggested for warts, it's best to check with your healthcare provider before trying them.    Warts often come back, even after successful treatment. Return promptly for treatment of any new warts.  Follow-up care  Follow up with your healthcare provider, or as advised.     When to seek medical advice    Signs of infection (red streaks, pus, smelly or colored discharge, or fever) appear    You have heavy bleeding or bleeding that won t stop with light pressure    The wart doesn t go away after several weeks of self-care    New warts appear on feet, hands, or face         Date Last Reviewed: 5/1/2018 2000-2018 [employer]. [address] [place] [address]. All rights reserved. This information is not intended as a substitute for professional medical care. Always follow your healthcare professional's instructions.

## 2019-04-10 ENCOUNTER — OFFICE VISIT (OUTPATIENT)
Dept: FAMILY MEDICINE | Facility: CLINIC | Age: 59
End: 2019-04-10
Payer: COMMERCIAL

## 2019-04-10 VITALS
HEIGHT: 69 IN | TEMPERATURE: 98.9 F | SYSTOLIC BLOOD PRESSURE: 130 MMHG | HEART RATE: 87 BPM | DIASTOLIC BLOOD PRESSURE: 88 MMHG | BODY MASS INDEX: 30.66 KG/M2 | OXYGEN SATURATION: 99 % | WEIGHT: 207 LBS

## 2019-04-10 DIAGNOSIS — J45.30 MILD PERSISTENT ASTHMA WITHOUT COMPLICATION: ICD-10-CM

## 2019-04-10 DIAGNOSIS — R05.8 POST-VIRAL COUGH SYNDROME: ICD-10-CM

## 2019-04-10 DIAGNOSIS — E78.5 HYPERLIPIDEMIA LDL GOAL <160: ICD-10-CM

## 2019-04-10 DIAGNOSIS — I10 ESSENTIAL HYPERTENSION: Primary | ICD-10-CM

## 2019-04-10 PROCEDURE — 80061 LIPID PANEL: CPT | Performed by: NURSE PRACTITIONER

## 2019-04-10 PROCEDURE — 80048 BASIC METABOLIC PNL TOTAL CA: CPT | Performed by: NURSE PRACTITIONER

## 2019-04-10 PROCEDURE — 99214 OFFICE O/P EST MOD 30 MIN: CPT | Performed by: NURSE PRACTITIONER

## 2019-04-10 PROCEDURE — 36415 COLL VENOUS BLD VENIPUNCTURE: CPT | Performed by: NURSE PRACTITIONER

## 2019-04-10 RX ORDER — ROSUVASTATIN CALCIUM 20 MG/1
20 TABLET, COATED ORAL DAILY
Qty: 90 TABLET | Refills: 3 | Status: SHIPPED | OUTPATIENT
Start: 2019-04-10 | End: 2020-03-24

## 2019-04-10 RX ORDER — LISINOPRIL 10 MG/1
10 TABLET ORAL DAILY
Qty: 90 TABLET | Refills: 3 | Status: SHIPPED | OUTPATIENT
Start: 2019-04-10

## 2019-04-10 RX ORDER — PREDNISONE 20 MG/1
20 TABLET ORAL DAILY
Qty: 5 TABLET | Refills: 0 | Status: SHIPPED | OUTPATIENT
Start: 2019-04-10 | End: 2019-04-15

## 2019-04-10 ASSESSMENT — MIFFLIN-ST. JEOR: SCORE: 1749.33
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
## 2019-04-11 LAB
ANION GAP SERPL CALCULATED.3IONS-SCNC: 6 MMOL/L (ref 3–14)
BUN SERPL-MCNC: 14 MG/DL (ref 7–30)
CALCIUM SERPL-MCNC: 9 MG/DL (ref 8.5–10.1)
CHLORIDE SERPL-SCNC: 107 MMOL/L (ref 94–109)
CHOLEST SERPL-MCNC: 189 MG/DL
CO2 SERPL-SCNC: 24 MMOL/L (ref 20–32)
CREAT SERPL-MCNC: 1.17 MG/DL (ref 0.66–1.25)
GFR SERPL CREATININE-BSD FRML MDRD: 68 ML/MIN/{1.73_M2}
GLUCOSE SERPL-MCNC: 87 MG/DL (ref 70–99)
HDLC SERPL-MCNC: 53 MG/DL
LDLC SERPL CALC-MCNC: 104 MG/DL
NONHDLC SERPL-MCNC: 136 MG/DL
POTASSIUM SERPL-SCNC: 4.1 MMOL/L (ref 3.4–5.3)
SODIUM SERPL-SCNC: 137 MMOL/L (ref 133–144)
TRIGL SERPL-MCNC: 159 MG/DL

## 2019-04-25 ENCOUNTER — TELEPHONE (OUTPATIENT)
Dept: FAMILY MEDICINE | Facility: CLINIC | Age: 59
End: 2019-04-25

## 2019-04-25 NOTE — TELEPHONE ENCOUNTER
Panel Management Review      Patient has the following on his problem list:     Asthma review     ACT Total Scores 1/16/2019   ACT TOTAL SCORE -   ASTHMA ER VISITS -   ASTHMA HOSPITALIZATIONS -   ACT TOTAL SCORE (Goal Greater than or Equal to 20) 25   In the past 12 months, how many times did you visit the emergency room for your asthma without being admitted to the hospital? 0   In the past 12 months, how many times were you hospitalized overnight because of your asthma? 0      1. Is Asthma diagnosis on the Problem List? Yes    2. Is Asthma listed on Health Maintenance? Yes    3. Patient is due for:  none      Composite cancer screening  Chart review shows that this patient is due/due soon for the following None  Summary:    Patient is due/failing the following:   none    Action needed:   None, patient was recently seen for HTN    Type of outreach:    none    Questions for provider review:    None                                                                                                                                    Tari Monson MA       Chart routed to none .

## 2019-05-15 DIAGNOSIS — J30.1 CHRONIC SEASONAL ALLERGIC RHINITIS DUE TO POLLEN: ICD-10-CM

## 2019-05-15 RX ORDER — MOMETASONE FUROATE MONOHYDRATE 50 UG/1
SPRAY, METERED NASAL
Qty: 17 G | Refills: 11 | Status: SHIPPED | OUTPATIENT
Start: 2019-05-15 | End: 2020-04-17

## 2019-05-16 ENCOUNTER — TELEPHONE (OUTPATIENT)
Dept: FAMILY MEDICINE | Facility: CLINIC | Age: 59
End: 2019-05-16

## 2019-05-16 DIAGNOSIS — J30.1 CHRONIC SEASONAL ALLERGIC RHINITIS DUE TO POLLEN: ICD-10-CM

## 2019-05-16 RX ORDER — MOMETASONE FUROATE MONOHYDRATE 50 UG/1
SPRAY, METERED NASAL
Qty: 17 G | Refills: 11 | Status: CANCELLED | OUTPATIENT
Start: 2019-05-16

## 2019-05-16 NOTE — TELEPHONE ENCOUNTER
Reason for Call:  Other call back    Detailed comments: Patient would like a call back regarding medication generic Nasonex nasal spray. [facility]. Please call patient to advise as pharmacy says no refills. Patient needs refill ASAP    Phone Number Patient can be reached at: Home number on file [phone] (home)    Best Time: any    Can we leave a detailed message on this number? YES    Call taken on 5/16/2019 at 7:59 AM by Gloria Gee

## 2019-05-16 NOTE — TELEPHONE ENCOUNTER
Please call pharmacy, did they receive prescription on 5/15/19?  If so please notify patient.   Connie Barajas RN

## 2019-05-16 NOTE — TELEPHONE ENCOUNTER
Spoke to pharmacy and confirmed they received rx. Spoke to patient and informed him rx at pharmacy.  Any TAVAREZ CMA ([facility])

## 2019-10-05 ENCOUNTER — HEALTH MAINTENANCE LETTER (OUTPATIENT)
Age: 59
End: 2019-10-05

## 2019-11-04 ENCOUNTER — TELEPHONE (OUTPATIENT)
Dept: FAMILY MEDICINE | Facility: CLINIC | Age: 59
End: 2019-11-04

## 2019-11-04 NOTE — TELEPHONE ENCOUNTER
Called and spoke with patient. Reports that he has previously always taken amoxicillin 1 hour prior to dental procedures but was told by his dentist that this is now changing and to speak with his PCP if abx is still needed or not.   Took prophylactic abx for LVH and pulmonary stenosis.  Patient has a dental procedure scheduled for tomorrow and is out of abx and would need a refill if he's supposed to take it.    Marianne Guzman RN

## 2019-11-04 NOTE — TELEPHONE ENCOUNTER
Huddled with Dianne Taylor, JANICE.   Only recommending abx for artificial valves at this point. Due to pulmonary stenosis, he does not need to continue on prophylactic abx prior to dental procedures.    Called and spoke with patient & notified of provider's information as written.   Verbalizes understanding & no further questions.     Marianne Guzman RN

## 2019-11-04 NOTE — TELEPHONE ENCOUNTER
Reason for Call:  Other call back    Detailed comments: Patient calling and has a question about antibiotics to going to the doctor. Please call to advise.     Phone Number Patient can be reached at: Home number on file [phone] (home)    Best Time: Any     Can we leave a detailed message on this number? YES    Call taken on 11/4/2019 at 12:36 PM by Vidhi Zhang

## 2020-01-10 ENCOUNTER — DOCUMENTATION ONLY (OUTPATIENT)
Dept: OPHTHALMOLOGY | Facility: CLINIC | Age: 60
End: 2020-01-10

## 2020-01-14 DIAGNOSIS — J45.30 MILD PERSISTENT ASTHMA WITHOUT COMPLICATION: ICD-10-CM

## 2020-03-24 DIAGNOSIS — E78.5 HYPERLIPIDEMIA LDL GOAL <160: ICD-10-CM

## 2020-03-24 RX ORDER — ROSUVASTATIN CALCIUM 20 MG/1
TABLET, COATED ORAL
Qty: 90 TABLET | Refills: 0 | Status: SHIPPED | OUTPATIENT
Start: 2020-03-24

## 2020-04-16 DIAGNOSIS — J30.1 CHRONIC SEASONAL ALLERGIC RHINITIS DUE TO POLLEN: ICD-10-CM

## 2020-04-17 RX ORDER — MOMETASONE FUROATE MONOHYDRATE 50 UG/1
SPRAY, METERED NASAL
Qty: 17 G | Refills: 0 | Status: SHIPPED | OUTPATIENT
Start: 2020-04-17 | End: 2020-05-12

## 2020-07-02 DIAGNOSIS — J30.1 CHRONIC SEASONAL ALLERGIC RHINITIS DUE TO POLLEN: ICD-10-CM

## 2020-07-03 RX ORDER — MOMETASONE FUROATE MONOHYDRATE 50 UG/1
SPRAY, METERED NASAL
Refills: 1 | OUTPATIENT
Start: 2020-07-03

## 2020-11-14 ENCOUNTER — HEALTH MAINTENANCE LETTER (OUTPATIENT)
Age: 60
End: 2020-11-14

## 2022-01-02 ENCOUNTER — HEALTH MAINTENANCE LETTER (OUTPATIENT)
Age: 62
End: 2022-01-02

## 2022-11-11 NOTE — TELEPHONE ENCOUNTER
Called patient and left VM to call clinic to schedule follow up. Please help schedule appointment if patient returns call.  Any TAVAREZ CMA ([facility])     Doctor's Office

## 2023-04-09 ENCOUNTER — HEALTH MAINTENANCE LETTER (OUTPATIENT)
Age: 63
End: 2023-04-09

## 2023-06-27 ENCOUNTER — TELEPHONE (OUTPATIENT)
Dept: OPHTHALMOLOGY | Facility: CLINIC | Age: 63
End: 2023-06-27
Payer: COMMERCIAL

## 2023-06-27 NOTE — TELEPHONE ENCOUNTER
[facility] Call Center    Phone Message    May a detailed message be left on voicemail: yes     Reason for Call: Other: Pt would like a call back from . He is requesting some information on the lens that was placed in his eyes back in 2014 w/ his cataracts procedure. Please reach out to pt regarding his request.    Thank You!    Action Taken: Message routed to:  [facility] eye    Travel Screening: Not Applicable

## 2023-06-27 NOTE — TELEPHONE ENCOUNTER
Spoke with patient -   He has cataract surgery scheduled for his left eye (he lives in [place] now). The Ophthalmologist asked him to obtain the IOL information from his right eye, but he is unable to locate the card he was given at the time of the surgery.  IOL info was given:  AR40 +23.00.

## 2023-06-27 NOTE — TELEPHONE ENCOUNTER
[facility] Call Center    Phone Message    May a detailed message be left on voicemail: yes     Reason for Call: Other: Patient returning Adilson's call. Please call patient back at [phone]. Thank you.     Action Taken: Message routed to:  Other: [facility]    Travel Screening: Not Applicable

## 2024-06-15 ENCOUNTER — HEALTH MAINTENANCE LETTER (OUTPATIENT)
Age: 64
End: 2024-06-15